# Patient Record
Sex: FEMALE | Race: WHITE | Employment: OTHER | URBAN - METROPOLITAN AREA
[De-identification: names, ages, dates, MRNs, and addresses within clinical notes are randomized per-mention and may not be internally consistent; named-entity substitution may affect disease eponyms.]

---

## 2018-03-15 ENCOUNTER — HOSPITAL ENCOUNTER (OUTPATIENT)
Dept: MAMMOGRAPHY | Age: 83
Discharge: HOME | End: 2018-03-15
Attending: INTERNAL MEDICINE | Admitting: INTERNAL MEDICINE

## 2018-03-15 DIAGNOSIS — Z12.31 SCREENING MAMMOGRAM, ENCOUNTER FOR: ICD-10-CM

## 2019-03-25 ENCOUNTER — HOSPITAL ENCOUNTER (OUTPATIENT)
Dept: MAMMOGRAPHY | Age: 84
Discharge: HOME | End: 2019-03-25
Payer: MEDICARE

## 2019-03-25 DIAGNOSIS — Z12.31 ENCOUNTER FOR SCREENING MAMMOGRAM FOR BREAST CANCER: ICD-10-CM

## 2019-03-25 PROCEDURE — 77063 BREAST TOMOSYNTHESIS BI: CPT

## 2020-01-01 ENCOUNTER — APPOINTMENT (OUTPATIENT)
Dept: NUCLEAR MEDICINE | Age: 85
DRG: 291 | End: 2020-01-01
Payer: MEDICARE

## 2020-01-01 ENCOUNTER — HOSPITAL ENCOUNTER (OUTPATIENT)
Age: 85
Setting detail: SPECIMEN
Discharge: HOME | End: 2020-11-09
Payer: MEDICARE

## 2020-01-01 ENCOUNTER — CARE COORDINATION (OUTPATIENT)
Dept: CASE MANAGEMENT | Age: 85
End: 2020-01-01

## 2020-01-01 ENCOUNTER — HOSPITAL ENCOUNTER (INPATIENT)
Age: 85
LOS: 6 days | Discharge: HOME HEALTH | DRG: 291 | End: 2020-08-27
Attending: EMERGENCY MEDICINE | Admitting: INTERNAL MEDICINE
Payer: MEDICARE

## 2020-01-01 ENCOUNTER — APPOINTMENT (OUTPATIENT)
Dept: CT IMAGING | Age: 85
DRG: 291 | End: 2020-01-01
Payer: MEDICARE

## 2020-01-01 ENCOUNTER — APPOINTMENT (OUTPATIENT)
Dept: GENERAL RADIOLOGY | Age: 85
End: 2020-01-01
Payer: MEDICARE

## 2020-01-01 ENCOUNTER — APPOINTMENT (OUTPATIENT)
Dept: GENERAL RADIOLOGY | Age: 85
DRG: 291 | End: 2020-01-01
Payer: MEDICARE

## 2020-01-01 ENCOUNTER — APPOINTMENT (OUTPATIENT)
Dept: CT IMAGING | Age: 85
End: 2020-01-01
Payer: MEDICARE

## 2020-01-01 ENCOUNTER — HOSPITAL ENCOUNTER (OUTPATIENT)
Age: 85
Setting detail: OBSERVATION
Discharge: NURSING, REHAB OR LONG-TERM CARE | End: 2020-11-24
Attending: EMERGENCY MEDICINE | Admitting: FAMILY MEDICINE
Payer: MEDICARE

## 2020-01-01 ENCOUNTER — HOSPITAL ENCOUNTER (EMERGENCY)
Age: 85
End: 2020-12-03
Attending: EMERGENCY MEDICINE
Payer: MEDICARE

## 2020-01-01 VITALS
SYSTOLIC BLOOD PRESSURE: 127 MMHG | BODY MASS INDEX: 20.87 KG/M2 | RESPIRATION RATE: 16 BRPM | WEIGHT: 137.7 LBS | TEMPERATURE: 97.9 F | HEIGHT: 68 IN | HEART RATE: 98 BPM | OXYGEN SATURATION: 98 % | DIASTOLIC BLOOD PRESSURE: 71 MMHG

## 2020-01-01 VITALS
HEIGHT: 68 IN | BODY MASS INDEX: 21.38 KG/M2 | TEMPERATURE: 97.9 F | RESPIRATION RATE: 16 BRPM | DIASTOLIC BLOOD PRESSURE: 64 MMHG | HEART RATE: 65 BPM | WEIGHT: 141.09 LBS | OXYGEN SATURATION: 98 % | SYSTOLIC BLOOD PRESSURE: 145 MMHG

## 2020-01-01 VITALS — DIASTOLIC BLOOD PRESSURE: 82 MMHG | RESPIRATION RATE: 82 BRPM | SYSTOLIC BLOOD PRESSURE: 156 MMHG | HEART RATE: 111 BPM

## 2020-01-01 LAB
A/G RATIO: 1.5 (ref 1.1–2.2)
A/G RATIO: 1.5 (ref 1.1–2.2)
A/G RATIO: 1.7 (ref 1.1–2.2)
ALBUMIN SERPL-MCNC: 4.1 G/DL (ref 3.4–5)
ALBUMIN SERPL-MCNC: 4.5 G/DL (ref 3.4–5)
ALBUMIN SERPL-MCNC: 4.6 G/DL (ref 3.4–5)
ALP BLD-CCNC: 71 U/L (ref 40–129)
ALP BLD-CCNC: 86 U/L (ref 40–129)
ALP BLD-CCNC: 94 U/L (ref 40–129)
ALT SERPL-CCNC: 10 U/L (ref 10–40)
ALT SERPL-CCNC: 14 U/L (ref 10–40)
ALT SERPL-CCNC: 8 U/L (ref 10–40)
ANION GAP SERPL CALCULATED.3IONS-SCNC: 10 MMOL/L (ref 3–16)
ANION GAP SERPL CALCULATED.3IONS-SCNC: 13 MMOL/L (ref 3–16)
ANION GAP SERPL CALCULATED.3IONS-SCNC: 17 MMOL/L (ref 3–16)
ANION GAP SERPL CALCULATED.3IONS-SCNC: 4 MMOL/L (ref 3–16)
ANION GAP SERPL CALCULATED.3IONS-SCNC: 7 MMOL/L (ref 3–16)
AST SERPL-CCNC: 21 U/L (ref 15–37)
AST SERPL-CCNC: 23 U/L (ref 15–37)
AST SERPL-CCNC: 27 U/L (ref 15–37)
BASOPHILS ABSOLUTE: 0 K/UL (ref 0–0.2)
BASOPHILS RELATIVE PERCENT: 0.5 %
BASOPHILS RELATIVE PERCENT: 0.6 %
BASOPHILS RELATIVE PERCENT: 0.6 %
BILIRUB SERPL-MCNC: 0.9 MG/DL (ref 0–1)
BILIRUB SERPL-MCNC: 1 MG/DL (ref 0–1)
BILIRUB SERPL-MCNC: 1.8 MG/DL (ref 0–1)
BILIRUBIN URINE: NEGATIVE
BILIRUBIN URINE: NEGATIVE
BLOOD CULTURE, ROUTINE: NORMAL
BLOOD, URINE: ABNORMAL
BLOOD, URINE: NEGATIVE
BUN BLDV-MCNC: 13 MG/DL (ref 7–20)
BUN BLDV-MCNC: 17 MG/DL (ref 7–20)
BUN BLDV-MCNC: 18 MG/DL (ref 7–20)
BUN BLDV-MCNC: 18 MG/DL (ref 7–20)
BUN BLDV-MCNC: 20 MG/DL (ref 7–20)
BUN BLDV-MCNC: 27 MG/DL (ref 7–20)
BUN BLDV-MCNC: 32 MG/DL (ref 7–20)
CALCIUM SERPL-MCNC: 10.1 MG/DL (ref 8.3–10.6)
CALCIUM SERPL-MCNC: 10.3 MG/DL (ref 8.3–10.6)
CALCIUM SERPL-MCNC: 10.3 MG/DL (ref 8.3–10.6)
CALCIUM SERPL-MCNC: 10.6 MG/DL (ref 8.3–10.6)
CALCIUM SERPL-MCNC: 10.7 MG/DL (ref 8.3–10.6)
CALCIUM SERPL-MCNC: 10.7 MG/DL (ref 8.3–10.6)
CALCIUM SERPL-MCNC: 10.8 MG/DL (ref 8.3–10.6)
CHLORIDE BLD-SCNC: 101 MMOL/L (ref 99–110)
CHLORIDE BLD-SCNC: 90 MMOL/L (ref 99–110)
CHLORIDE BLD-SCNC: 92 MMOL/L (ref 99–110)
CHLORIDE BLD-SCNC: 96 MMOL/L (ref 99–110)
CHLORIDE BLD-SCNC: 98 MMOL/L (ref 99–110)
CHLORIDE BLD-SCNC: 99 MMOL/L (ref 99–110)
CHLORIDE BLD-SCNC: 99 MMOL/L (ref 99–110)
CLARITY: CLEAR
CLARITY: CLEAR
CO2: 27 MMOL/L (ref 21–32)
CO2: 28 MMOL/L (ref 21–32)
CO2: 29 MMOL/L (ref 21–32)
CO2: 29 MMOL/L (ref 21–32)
CO2: 31 MMOL/L (ref 21–32)
CO2: 31 MMOL/L (ref 21–32)
CO2: 33 MMOL/L (ref 21–32)
COLOR: YELLOW
COLOR: YELLOW
CREAT SERPL-MCNC: 0.7 MG/DL (ref 0.6–1.2)
CREAT SERPL-MCNC: 0.8 MG/DL (ref 0.6–1.2)
CREAT SERPL-MCNC: 0.8 MG/DL (ref 0.6–1.2)
CREAT SERPL-MCNC: 0.9 MG/DL (ref 0.6–1.2)
CREAT SERPL-MCNC: 1 MG/DL (ref 0.6–1.2)
CREAT SERPL-MCNC: 1.1 MG/DL (ref 0.6–1.2)
CREAT SERPL-MCNC: <0.5 MG/DL (ref 0.6–1.2)
CULTURE, BLOOD 2: NORMAL
EKG ATRIAL RATE: 416 BPM
EKG ATRIAL RATE: 60 BPM
EKG DIAGNOSIS: NORMAL
EKG DIAGNOSIS: NORMAL
EKG Q-T INTERVAL: 412 MS
EKG Q-T INTERVAL: 474 MS
EKG QRS DURATION: 150 MS
EKG QRS DURATION: 152 MS
EKG QTC CALCULATION (BAZETT): 475 MS
EKG QTC CALCULATION (BAZETT): 489 MS
EKG R AXIS: -88 DEGREES
EKG R AXIS: 267 DEGREES
EKG T AXIS: 64 DEGREES
EKG T AXIS: 94 DEGREES
EKG VENTRICULAR RATE: 64 BPM
EKG VENTRICULAR RATE: 80 BPM
EOSINOPHILS ABSOLUTE: 0.1 K/UL (ref 0–0.6)
EOSINOPHILS ABSOLUTE: 0.1 K/UL (ref 0–0.6)
EOSINOPHILS ABSOLUTE: 0.2 K/UL (ref 0–0.6)
EOSINOPHILS RELATIVE PERCENT: 1.6 %
EOSINOPHILS RELATIVE PERCENT: 2.3 %
EOSINOPHILS RELATIVE PERCENT: 2.8 %
EPITHELIAL CELLS, UA: ABNORMAL /HPF (ref 0–5)
FOLATE: 10.29 NG/ML (ref 4.78–24.2)
GFR AFRICAN AMERICAN: 56
GFR AFRICAN AMERICAN: >60
GFR NON-AFRICAN AMERICAN: 46
GFR NON-AFRICAN AMERICAN: 52
GFR NON-AFRICAN AMERICAN: 58
GFR NON-AFRICAN AMERICAN: >60
GLOBULIN: 2.7 G/DL
GLOBULIN: 2.7 G/DL
GLOBULIN: 3 G/DL
GLUCOSE BLD-MCNC: 103 MG/DL (ref 70–99)
GLUCOSE BLD-MCNC: 104 MG/DL (ref 70–99)
GLUCOSE BLD-MCNC: 90 MG/DL (ref 70–99)
GLUCOSE BLD-MCNC: 90 MG/DL (ref 70–99)
GLUCOSE BLD-MCNC: 92 MG/DL (ref 70–99)
GLUCOSE BLD-MCNC: 95 MG/DL (ref 70–99)
GLUCOSE BLD-MCNC: 96 MG/DL (ref 70–99)
GLUCOSE BLD-MCNC: 98 MG/DL (ref 70–99)
GLUCOSE URINE: NEGATIVE MG/DL
GLUCOSE URINE: NEGATIVE MG/DL
HCT VFR BLD CALC: 38.5 % (ref 36–48)
HCT VFR BLD CALC: 39.2 % (ref 36–48)
HCT VFR BLD CALC: 39.9 % (ref 36–48)
HCT VFR BLD CALC: 40.4 % (ref 36–48)
HCT VFR BLD CALC: 42.6 % (ref 36–48)
HEMOGLOBIN: 12.9 G/DL (ref 12–16)
HEMOGLOBIN: 13 G/DL (ref 12–16)
HEMOGLOBIN: 13.2 G/DL (ref 12–16)
HEMOGLOBIN: 13.3 G/DL (ref 12–16)
HEMOGLOBIN: 14.4 G/DL (ref 12–16)
HYALINE CASTS: ABNORMAL /LPF (ref 0–2)
INR BLD: 1.49 (ref 0.86–1.14)
INR BLD: 1.62 (ref 0.86–1.14)
INR BLD: 1.7 (ref 0.86–1.14)
INR BLD: 2.45 (ref 0.86–1.14)
INR BLD: 2.58 (ref 0.86–1.14)
INR BLD: 2.81 (ref 0.86–1.14)
INR BLD: 3.03 (ref 0.86–1.14)
INR BLD: 3.6 (ref 0.86–1.14)
INR BLD: 3.74 (ref 0.86–1.14)
INR BLD: 3.89 (ref 0.86–1.14)
KETONES, URINE: ABNORMAL MG/DL
KETONES, URINE: NEGATIVE MG/DL
LACTIC ACID, SEPSIS: 0.8 MMOL/L (ref 0.4–1.9)
LEUKOCYTE ESTERASE, URINE: NEGATIVE
LEUKOCYTE ESTERASE, URINE: NEGATIVE
LV EF: 58 %
LV EF: 66 %
LVEF MODALITY: NORMAL
LVEF MODALITY: NORMAL
LYMPHOCYTES ABSOLUTE: 1.2 K/UL (ref 1–5.1)
LYMPHOCYTES ABSOLUTE: 1.4 K/UL (ref 1–5.1)
LYMPHOCYTES ABSOLUTE: 1.4 K/UL (ref 1–5.1)
LYMPHOCYTES RELATIVE PERCENT: 20.6 %
LYMPHOCYTES RELATIVE PERCENT: 22.4 %
LYMPHOCYTES RELATIVE PERCENT: 25.1 %
MAGNESIUM: 1.9 MG/DL (ref 1.8–2.4)
MAGNESIUM: 2.4 MG/DL (ref 1.8–2.4)
MCH RBC QN AUTO: 31.6 PG (ref 26–34)
MCH RBC QN AUTO: 31.8 PG (ref 26–34)
MCHC RBC AUTO-ENTMCNC: 32.7 G/DL (ref 31–36)
MCHC RBC AUTO-ENTMCNC: 33.2 G/DL (ref 31–36)
MCHC RBC AUTO-ENTMCNC: 33.3 G/DL (ref 31–36)
MCHC RBC AUTO-ENTMCNC: 33.4 G/DL (ref 31–36)
MCHC RBC AUTO-ENTMCNC: 33.7 G/DL (ref 31–36)
MCV RBC AUTO: 94.4 FL (ref 80–100)
MCV RBC AUTO: 94.4 FL (ref 80–100)
MCV RBC AUTO: 95.6 FL (ref 80–100)
MCV RBC AUTO: 95.7 FL (ref 80–100)
MCV RBC AUTO: 97.3 FL (ref 80–100)
MICROSCOPIC EXAMINATION: ABNORMAL
MICROSCOPIC EXAMINATION: YES
MONOCYTES ABSOLUTE: 0.6 K/UL (ref 0–1.3)
MONOCYTES ABSOLUTE: 0.6 K/UL (ref 0–1.3)
MONOCYTES ABSOLUTE: 0.7 K/UL (ref 0–1.3)
MONOCYTES RELATIVE PERCENT: 10.3 %
MONOCYTES RELATIVE PERCENT: 10.4 %
MONOCYTES RELATIVE PERCENT: 11.7 %
MUCUS: ABNORMAL /LPF
NEUTROPHILS ABSOLUTE: 3.5 K/UL (ref 1.7–7.7)
NEUTROPHILS ABSOLUTE: 3.8 K/UL (ref 1.7–7.7)
NEUTROPHILS ABSOLUTE: 4 K/UL (ref 1.7–7.7)
NEUTROPHILS RELATIVE PERCENT: 61.7 %
NEUTROPHILS RELATIVE PERCENT: 63.9 %
NEUTROPHILS RELATIVE PERCENT: 65.5 %
NITRITE, URINE: NEGATIVE
NITRITE, URINE: NEGATIVE
PDW BLD-RTO: 13 % (ref 12.4–15.4)
PDW BLD-RTO: 13.3 % (ref 12.4–15.4)
PDW BLD-RTO: 13.3 % (ref 12.4–15.4)
PDW BLD-RTO: 13.8 % (ref 12.4–15.4)
PDW BLD-RTO: 13.9 % (ref 12.4–15.4)
PERFORMED ON: NORMAL
PH UA: 6 (ref 5–8)
PH UA: 6 (ref 5–8)
PLATELET # BLD: 162 K/UL (ref 135–450)
PLATELET # BLD: 167 K/UL (ref 135–450)
PLATELET # BLD: 177 K/UL (ref 135–450)
PLATELET # BLD: 185 K/UL (ref 135–450)
PLATELET # BLD: 203 K/UL (ref 135–450)
PMV BLD AUTO: 10.3 FL (ref 5–10.5)
PMV BLD AUTO: 9 FL (ref 5–10.5)
PMV BLD AUTO: 9.2 FL (ref 5–10.5)
PMV BLD AUTO: 9.2 FL (ref 5–10.5)
PMV BLD AUTO: 9.6 FL (ref 5–10.5)
POTASSIUM REFLEX MAGNESIUM: 3.8 MMOL/L (ref 3.5–5.1)
POTASSIUM REFLEX MAGNESIUM: 3.8 MMOL/L (ref 3.5–5.1)
POTASSIUM REFLEX MAGNESIUM: 4.6 MMOL/L (ref 3.5–5.1)
POTASSIUM SERPL-SCNC: 3.8 MMOL/L (ref 3.5–5.1)
POTASSIUM SERPL-SCNC: 4.4 MMOL/L (ref 3.5–5.1)
POTASSIUM SERPL-SCNC: 4.7 MMOL/L (ref 3.5–5.1)
POTASSIUM SERPL-SCNC: 4.7 MMOL/L (ref 3.5–5.1)
PRO-BNP: 2505 PG/ML (ref 0–449)
PROTEIN UA: NEGATIVE MG/DL
PROTEIN UA: NEGATIVE MG/DL
PROTHROMBIN TIME: 17.4 SEC (ref 10–13.2)
PROTHROMBIN TIME: 18.9 SEC (ref 10–13.2)
PROTHROMBIN TIME: 19.8 SEC (ref 10–13.2)
PROTHROMBIN TIME: 28.7 SEC (ref 10–13.2)
PROTHROMBIN TIME: 29.1 SEC (ref 10–13.2)
PROTHROMBIN TIME: 32.9 SEC (ref 10–13.2)
PROTHROMBIN TIME: 35.5 SEC (ref 10–13.2)
PROTHROMBIN TIME: 42.3 SEC (ref 10–13.2)
PROTHROMBIN TIME: 44 SEC (ref 10–13.2)
PROTHROMBIN TIME: 45.8 SEC (ref 10–13.2)
RBC # BLD: 4.08 M/UL (ref 4–5.2)
RBC # BLD: 4.09 M/UL (ref 4–5.2)
RBC # BLD: 4.15 M/UL (ref 4–5.2)
RBC # BLD: 4.18 M/UL (ref 4–5.2)
RBC # BLD: 4.51 M/UL (ref 4–5.2)
RBC UA: ABNORMAL /HPF (ref 0–4)
SARS-COV-2, NAAT: NOT DETECTED
SARS-COV-2, PCR: NOT DETECTED
SODIUM BLD-SCNC: 136 MMOL/L (ref 136–145)
SODIUM BLD-SCNC: 137 MMOL/L (ref 136–145)
SODIUM BLD-SCNC: 138 MMOL/L (ref 136–145)
SODIUM BLD-SCNC: 138 MMOL/L (ref 136–145)
SODIUM BLD-SCNC: 139 MMOL/L (ref 136–145)
SPECIFIC GRAVITY UA: 1.02 (ref 1–1.03)
SPECIFIC GRAVITY UA: 1.02 (ref 1–1.03)
TOTAL PROTEIN: 6.8 G/DL (ref 6.4–8.2)
TOTAL PROTEIN: 7.3 G/DL (ref 6.4–8.2)
TOTAL PROTEIN: 7.5 G/DL (ref 6.4–8.2)
TROPONIN: <0.01 NG/ML
TSH REFLEX: 1.5 UIU/ML (ref 0.27–4.2)
URINE REFLEX TO CULTURE: ABNORMAL
URINE REFLEX TO CULTURE: ABNORMAL
URINE TYPE: ABNORMAL
URINE TYPE: ABNORMAL
UROBILINOGEN, URINE: 2 E.U./DL
UROBILINOGEN, URINE: 4 E.U./DL
VITAMIN B-12: 687 PG/ML (ref 211–911)
WBC # BLD: 5.3 K/UL (ref 4–11)
WBC # BLD: 5.7 K/UL (ref 4–11)
WBC # BLD: 5.9 K/UL (ref 4–11)
WBC # BLD: 6.2 K/UL (ref 4–11)
WBC # BLD: 7.4 K/UL (ref 4–11)
WBC UA: ABNORMAL /HPF (ref 0–5)

## 2020-01-01 PROCEDURE — 99285 EMERGENCY DEPT VISIT HI MDM: CPT

## 2020-01-01 PROCEDURE — 6370000000 HC RX 637 (ALT 250 FOR IP): Performed by: INTERNAL MEDICINE

## 2020-01-01 PROCEDURE — 6370000000 HC RX 637 (ALT 250 FOR IP): Performed by: FAMILY MEDICINE

## 2020-01-01 PROCEDURE — 51798 US URINE CAPACITY MEASURE: CPT

## 2020-01-01 PROCEDURE — 94640 AIRWAY INHALATION TREATMENT: CPT

## 2020-01-01 PROCEDURE — 80048 BASIC METABOLIC PNL TOTAL CA: CPT

## 2020-01-01 PROCEDURE — 36415 COLL VENOUS BLD VENIPUNCTURE: CPT

## 2020-01-01 PROCEDURE — 85025 COMPLETE CBC W/AUTO DIFF WBC: CPT

## 2020-01-01 PROCEDURE — G0378 HOSPITAL OBSERVATION PER HR: HCPCS

## 2020-01-01 PROCEDURE — 2580000003 HC RX 258: Performed by: INTERNAL MEDICINE

## 2020-01-01 PROCEDURE — 78452 HT MUSCLE IMAGE SPECT MULT: CPT

## 2020-01-01 PROCEDURE — 2700000000 HC OXYGEN THERAPY PER DAY

## 2020-01-01 PROCEDURE — 85027 COMPLETE CBC AUTOMATED: CPT

## 2020-01-01 PROCEDURE — 97535 SELF CARE MNGMENT TRAINING: CPT

## 2020-01-01 PROCEDURE — U0003 INFECTIOUS AGENT DETECTION BY NUCLEIC ACID (DNA OR RNA); SEVERE ACUTE RESPIRATORY SYNDROME CORONAVIRUS 2 (SARS-COV-2) (CORONAVIRUS DISEASE [COVID-19]), AMPLIFIED PROBE TECHNIQUE, MAKING USE OF HIGH THROUGHPUT TECHNOLOGIES AS DESCRIBED BY CMS-2020-01-R: HCPCS

## 2020-01-01 PROCEDURE — 70450 CT HEAD/BRAIN W/O DYE: CPT

## 2020-01-01 PROCEDURE — 83605 ASSAY OF LACTIC ACID: CPT

## 2020-01-01 PROCEDURE — 93005 ELECTROCARDIOGRAM TRACING: CPT | Performed by: NURSE PRACTITIONER

## 2020-01-01 PROCEDURE — 1200000000 HC SEMI PRIVATE

## 2020-01-01 PROCEDURE — 85610 PROTHROMBIN TIME: CPT

## 2020-01-01 PROCEDURE — 6360000002 HC RX W HCPCS: Performed by: REGISTERED NURSE

## 2020-01-01 PROCEDURE — 84443 ASSAY THYROID STIM HORMONE: CPT

## 2020-01-01 PROCEDURE — 6370000000 HC RX 637 (ALT 250 FOR IP): Performed by: NURSE PRACTITIONER

## 2020-01-01 PROCEDURE — 6370000000 HC RX 637 (ALT 250 FOR IP): Performed by: REGISTERED NURSE

## 2020-01-01 PROCEDURE — 71045 X-RAY EXAM CHEST 1 VIEW: CPT

## 2020-01-01 PROCEDURE — 3430000000 HC RX DIAGNOSTIC RADIOPHARMACEUTICAL: Performed by: INTERNAL MEDICINE

## 2020-01-01 PROCEDURE — 94761 N-INVAS EAR/PLS OXIMETRY MLT: CPT

## 2020-01-01 PROCEDURE — A9502 TC99M TETROFOSMIN: HCPCS | Performed by: INTERNAL MEDICINE

## 2020-01-01 PROCEDURE — 6360000002 HC RX W HCPCS: Performed by: INTERNAL MEDICINE

## 2020-01-01 PROCEDURE — 97166 OT EVAL MOD COMPLEX 45 MIN: CPT

## 2020-01-01 PROCEDURE — 71250 CT THORAX DX C-: CPT

## 2020-01-01 PROCEDURE — 82746 ASSAY OF FOLIC ACID SERUM: CPT

## 2020-01-01 PROCEDURE — 97530 THERAPEUTIC ACTIVITIES: CPT

## 2020-01-01 PROCEDURE — 87040 BLOOD CULTURE FOR BACTERIA: CPT

## 2020-01-01 PROCEDURE — 99283 EMERGENCY DEPT VISIT LOW MDM: CPT

## 2020-01-01 PROCEDURE — 2580000003 HC RX 258: Performed by: NURSE PRACTITIONER

## 2020-01-01 PROCEDURE — 84484 ASSAY OF TROPONIN QUANT: CPT

## 2020-01-01 PROCEDURE — 51701 INSERT BLADDER CATHETER: CPT

## 2020-01-01 PROCEDURE — 2580000003 HC RX 258: Performed by: REGISTERED NURSE

## 2020-01-01 PROCEDURE — 80053 COMPREHEN METABOLIC PANEL: CPT

## 2020-01-01 PROCEDURE — 99291 CRITICAL CARE FIRST HOUR: CPT

## 2020-01-01 PROCEDURE — 83735 ASSAY OF MAGNESIUM: CPT

## 2020-01-01 PROCEDURE — 93010 ELECTROCARDIOGRAM REPORT: CPT | Performed by: INTERNAL MEDICINE

## 2020-01-01 PROCEDURE — 93005 ELECTROCARDIOGRAM TRACING: CPT | Performed by: EMERGENCY MEDICINE

## 2020-01-01 PROCEDURE — 2500000003 HC RX 250 WO HCPCS: Performed by: REGISTERED NURSE

## 2020-01-01 PROCEDURE — 97165 OT EVAL LOW COMPLEX 30 MIN: CPT

## 2020-01-01 PROCEDURE — 81003 URINALYSIS AUTO W/O SCOPE: CPT

## 2020-01-01 PROCEDURE — 81001 URINALYSIS AUTO W/SCOPE: CPT

## 2020-01-01 PROCEDURE — 99284 EMERGENCY DEPT VISIT MOD MDM: CPT

## 2020-01-01 PROCEDURE — U0002 COVID-19 LAB TEST NON-CDC: HCPCS

## 2020-01-01 PROCEDURE — 93017 CV STRESS TEST TRACING ONLY: CPT

## 2020-01-01 PROCEDURE — 99223 1ST HOSP IP/OBS HIGH 75: CPT | Performed by: INTERNAL MEDICINE

## 2020-01-01 PROCEDURE — P9612 CATHETERIZE FOR URINE SPEC: HCPCS

## 2020-01-01 PROCEDURE — 97116 GAIT TRAINING THERAPY: CPT

## 2020-01-01 PROCEDURE — 96361 HYDRATE IV INFUSION ADD-ON: CPT

## 2020-01-01 PROCEDURE — 96360 HYDRATION IV INFUSION INIT: CPT

## 2020-01-01 PROCEDURE — 71046 X-RAY EXAM CHEST 2 VIEWS: CPT

## 2020-01-01 PROCEDURE — 93306 TTE W/DOPPLER COMPLETE: CPT

## 2020-01-01 PROCEDURE — 83880 ASSAY OF NATRIURETIC PEPTIDE: CPT

## 2020-01-01 PROCEDURE — 97161 PT EVAL LOW COMPLEX 20 MIN: CPT

## 2020-01-01 PROCEDURE — 82607 VITAMIN B-12: CPT

## 2020-01-01 PROCEDURE — 2580000003 HC RX 258: Performed by: FAMILY MEDICINE

## 2020-01-01 PROCEDURE — 97162 PT EVAL MOD COMPLEX 30 MIN: CPT

## 2020-01-01 PROCEDURE — 92950 HEART/LUNG RESUSCITATION CPR: CPT

## 2020-01-01 RX ORDER — FUROSEMIDE 10 MG/ML
40 INJECTION INTRAMUSCULAR; INTRAVENOUS 2 TIMES DAILY
Status: COMPLETED | OUTPATIENT
Start: 2020-01-01 | End: 2020-01-01

## 2020-01-01 RX ORDER — SODIUM CHLORIDE 0.9 % (FLUSH) 0.9 %
10 SYRINGE (ML) INJECTION PRN
Status: DISCONTINUED | OUTPATIENT
Start: 2020-01-01 | End: 2020-01-01 | Stop reason: HOSPADM

## 2020-01-01 RX ORDER — WARFARIN SODIUM 5 MG/1
5 TABLET ORAL
Status: DISPENSED | OUTPATIENT
Start: 2020-01-01 | End: 2020-01-01

## 2020-01-01 RX ORDER — PROMETHAZINE HYDROCHLORIDE 25 MG/1
12.5 TABLET ORAL EVERY 6 HOURS PRN
Status: DISCONTINUED | OUTPATIENT
Start: 2020-01-01 | End: 2020-01-01 | Stop reason: HOSPADM

## 2020-01-01 RX ORDER — MAGNESIUM SULFATE 1 G/100ML
1 INJECTION INTRAVENOUS PRN
Status: DISCONTINUED | OUTPATIENT
Start: 2020-01-01 | End: 2020-01-01

## 2020-01-01 RX ORDER — QUETIAPINE FUMARATE 25 MG/1
50 TABLET, FILM COATED ORAL ONCE
Status: COMPLETED | OUTPATIENT
Start: 2020-01-01 | End: 2020-01-01

## 2020-01-01 RX ORDER — LABETALOL HYDROCHLORIDE 5 MG/ML
5 INJECTION, SOLUTION INTRAVENOUS EVERY 4 HOURS PRN
Status: DISCONTINUED | OUTPATIENT
Start: 2020-01-01 | End: 2020-01-01

## 2020-01-01 RX ORDER — ACETAMINOPHEN 650 MG/1
650 SUPPOSITORY RECTAL EVERY 6 HOURS PRN
Status: DISCONTINUED | OUTPATIENT
Start: 2020-01-01 | End: 2020-01-01 | Stop reason: HOSPADM

## 2020-01-01 RX ORDER — METOPROLOL SUCCINATE 25 MG/1
25 TABLET, EXTENDED RELEASE ORAL DAILY
Qty: 30 TABLET | Refills: 3 | Status: SHIPPED | OUTPATIENT
Start: 2020-01-01

## 2020-01-01 RX ORDER — DICYCLOMINE HCL 20 MG
20 TABLET ORAL EVERY 6 HOURS
Status: DISCONTINUED | OUTPATIENT
Start: 2020-01-01 | End: 2020-01-01 | Stop reason: HOSPADM

## 2020-01-01 RX ORDER — 0.9 % SODIUM CHLORIDE 0.9 %
500 INTRAVENOUS SOLUTION INTRAVENOUS ONCE
Status: COMPLETED | OUTPATIENT
Start: 2020-01-01 | End: 2020-01-01

## 2020-01-01 RX ORDER — POLYETHYLENE GLYCOL 3350 17 G/17G
17 POWDER, FOR SOLUTION ORAL DAILY PRN
Status: DISCONTINUED | OUTPATIENT
Start: 2020-01-01 | End: 2020-01-01 | Stop reason: HOSPADM

## 2020-01-01 RX ORDER — WARFARIN SODIUM 5 MG/1
5 TABLET ORAL
Status: COMPLETED | OUTPATIENT
Start: 2020-01-01 | End: 2020-01-01

## 2020-01-01 RX ORDER — FAMOTIDINE 20 MG/1
20 TABLET, FILM COATED ORAL DAILY
Status: DISCONTINUED | OUTPATIENT
Start: 2020-01-01 | End: 2020-01-01

## 2020-01-01 RX ORDER — 0.9 % SODIUM CHLORIDE 0.9 %
1000 INTRAVENOUS SOLUTION INTRAVENOUS ONCE
Status: COMPLETED | OUTPATIENT
Start: 2020-01-01 | End: 2020-01-01

## 2020-01-01 RX ORDER — ACETAMINOPHEN 325 MG/1
650 TABLET ORAL EVERY 6 HOURS PRN
Status: DISCONTINUED | OUTPATIENT
Start: 2020-01-01 | End: 2020-01-01 | Stop reason: HOSPADM

## 2020-01-01 RX ORDER — METOPROLOL SUCCINATE 25 MG/1
25 TABLET, EXTENDED RELEASE ORAL DAILY
Status: DISCONTINUED | OUTPATIENT
Start: 2020-01-01 | End: 2020-01-01 | Stop reason: HOSPADM

## 2020-01-01 RX ORDER — SPIRONOLACTONE 25 MG/1
25 TABLET ORAL DAILY
Status: DISCONTINUED | OUTPATIENT
Start: 2020-01-01 | End: 2020-01-01

## 2020-01-01 RX ORDER — ALBUTEROL SULFATE 2.5 MG/3ML
2.5 SOLUTION RESPIRATORY (INHALATION) EVERY 6 HOURS PRN
Status: DISCONTINUED | OUTPATIENT
Start: 2020-01-01 | End: 2020-01-01 | Stop reason: HOSPADM

## 2020-01-01 RX ORDER — FUROSEMIDE 40 MG/1
40 TABLET ORAL DAILY
Status: DISCONTINUED | OUTPATIENT
Start: 2020-01-01 | End: 2020-01-01

## 2020-01-01 RX ORDER — METHOCARBAMOL 750 MG/1
1500 TABLET, FILM COATED ORAL 3 TIMES DAILY PRN
Status: DISCONTINUED | OUTPATIENT
Start: 2020-01-01 | End: 2020-01-01 | Stop reason: HOSPADM

## 2020-01-01 RX ORDER — ALBUTEROL SULFATE 90 UG/1
2 AEROSOL, METERED RESPIRATORY (INHALATION) EVERY 6 HOURS PRN
Status: DISCONTINUED | OUTPATIENT
Start: 2020-01-01 | End: 2020-01-01

## 2020-01-01 RX ORDER — SODIUM CHLORIDE 0.9 % (FLUSH) 0.9 %
10 SYRINGE (ML) INJECTION EVERY 12 HOURS SCHEDULED
Status: DISCONTINUED | OUTPATIENT
Start: 2020-01-01 | End: 2020-01-01 | Stop reason: HOSPADM

## 2020-01-01 RX ORDER — POTASSIUM CHLORIDE 20 MEQ/1
40 TABLET, EXTENDED RELEASE ORAL PRN
Status: DISCONTINUED | OUTPATIENT
Start: 2020-01-01 | End: 2020-01-01

## 2020-01-01 RX ORDER — POTASSIUM CHLORIDE 7.45 MG/ML
10 INJECTION INTRAVENOUS PRN
Status: DISCONTINUED | OUTPATIENT
Start: 2020-01-01 | End: 2020-01-01

## 2020-01-01 RX ORDER — WARFARIN SODIUM 2.5 MG/1
2.5 TABLET ORAL DAILY
Qty: 30 TABLET | Refills: 3 | Status: SHIPPED | OUTPATIENT
Start: 2020-01-01

## 2020-01-01 RX ORDER — METOPROLOL TARTRATE 5 MG/5ML
2.5 INJECTION INTRAVENOUS EVERY 6 HOURS PRN
Status: DISCONTINUED | OUTPATIENT
Start: 2020-01-01 | End: 2020-01-01 | Stop reason: HOSPADM

## 2020-01-01 RX ORDER — ALBUTEROL SULFATE 2.5 MG/3ML
2.5 SOLUTION RESPIRATORY (INHALATION) EVERY 4 HOURS PRN
Status: DISCONTINUED | OUTPATIENT
Start: 2020-01-01 | End: 2020-01-01 | Stop reason: HOSPADM

## 2020-01-01 RX ORDER — HALOPERIDOL 5 MG/ML
2 INJECTION INTRAMUSCULAR EVERY 6 HOURS PRN
Status: DISCONTINUED | OUTPATIENT
Start: 2020-01-01 | End: 2020-01-01 | Stop reason: HOSPADM

## 2020-01-01 RX ORDER — FUROSEMIDE 10 MG/ML
40 INJECTION INTRAMUSCULAR; INTRAVENOUS ONCE
Status: COMPLETED | OUTPATIENT
Start: 2020-01-01 | End: 2020-01-01

## 2020-01-01 RX ORDER — WARFARIN SODIUM 2.5 MG/1
2.5 TABLET ORAL DAILY
Status: DISCONTINUED | OUTPATIENT
Start: 2020-01-01 | End: 2020-01-01 | Stop reason: ALTCHOICE

## 2020-01-01 RX ORDER — WARFARIN SODIUM 2 MG/1
4 TABLET ORAL
Status: COMPLETED | OUTPATIENT
Start: 2020-01-01 | End: 2020-01-01

## 2020-01-01 RX ORDER — QUETIAPINE FUMARATE 50 MG/1
50 TABLET, EXTENDED RELEASE ORAL NIGHTLY
Status: DISCONTINUED | OUTPATIENT
Start: 2020-01-01 | End: 2020-01-01 | Stop reason: HOSPADM

## 2020-01-01 RX ORDER — PROCHLORPERAZINE EDISYLATE 5 MG/ML
10 INJECTION INTRAMUSCULAR; INTRAVENOUS EVERY 6 HOURS PRN
Status: DISCONTINUED | OUTPATIENT
Start: 2020-01-01 | End: 2020-01-01 | Stop reason: HOSPADM

## 2020-01-01 RX ORDER — WARFARIN SODIUM 5 MG/1
5 TABLET ORAL
Status: DISCONTINUED | OUTPATIENT
Start: 2020-01-01 | End: 2020-01-01 | Stop reason: HOSPADM

## 2020-01-01 RX ORDER — ALBUTEROL SULFATE 90 UG/1
2 AEROSOL, METERED RESPIRATORY (INHALATION)
Status: DISCONTINUED | OUTPATIENT
Start: 2020-01-01 | End: 2020-01-01

## 2020-01-01 RX ORDER — ALBUTEROL SULFATE 2.5 MG/3ML
2.5 SOLUTION RESPIRATORY (INHALATION) EVERY 6 HOURS PRN
Status: DISCONTINUED | OUTPATIENT
Start: 2020-01-01 | End: 2020-01-01

## 2020-01-01 RX ORDER — SODIUM CHLORIDE 9 MG/ML
INJECTION, SOLUTION INTRAVENOUS CONTINUOUS
Status: DISCONTINUED | OUTPATIENT
Start: 2020-01-01 | End: 2020-01-01

## 2020-01-01 RX ORDER — FUROSEMIDE 40 MG/1
40 TABLET ORAL DAILY
Status: DISCONTINUED | OUTPATIENT
Start: 2020-01-01 | End: 2020-01-01 | Stop reason: HOSPADM

## 2020-01-01 RX ORDER — LISINOPRIL 20 MG/1
20 TABLET ORAL DAILY
Status: DISCONTINUED | OUTPATIENT
Start: 2020-01-01 | End: 2020-01-01 | Stop reason: HOSPADM

## 2020-01-01 RX ORDER — ONDANSETRON 2 MG/ML
4 INJECTION INTRAMUSCULAR; INTRAVENOUS EVERY 6 HOURS PRN
Status: DISCONTINUED | OUTPATIENT
Start: 2020-01-01 | End: 2020-01-01 | Stop reason: HOSPADM

## 2020-01-01 RX ORDER — ALBUTEROL SULFATE 90 UG/1
2 AEROSOL, METERED RESPIRATORY (INHALATION) EVERY 4 HOURS PRN
Status: DISCONTINUED | OUTPATIENT
Start: 2020-01-01 | End: 2020-01-01 | Stop reason: HOSPADM

## 2020-01-01 RX ORDER — FAMOTIDINE 20 MG/1
20 TABLET, FILM COATED ORAL DAILY
Status: DISCONTINUED | OUTPATIENT
Start: 2020-01-01 | End: 2020-01-01 | Stop reason: HOSPADM

## 2020-01-01 RX ORDER — POTASSIUM CHLORIDE 20 MEQ/1
20 TABLET, EXTENDED RELEASE ORAL DAILY
Status: DISCONTINUED | OUTPATIENT
Start: 2020-01-01 | End: 2020-01-01 | Stop reason: HOSPADM

## 2020-01-01 RX ADMIN — FUROSEMIDE 40 MG: 40 TABLET ORAL at 09:10

## 2020-01-01 RX ADMIN — Medication 2 PUFF: at 08:01

## 2020-01-01 RX ADMIN — DICYCLOMINE HYDROCHLORIDE 20 MG: 20 TABLET ORAL at 10:30

## 2020-01-01 RX ADMIN — DICYCLOMINE HYDROCHLORIDE 20 MG: 20 TABLET ORAL at 22:31

## 2020-01-01 RX ADMIN — FUROSEMIDE 40 MG: 40 TABLET ORAL at 08:47

## 2020-01-01 RX ADMIN — FAMOTIDINE 20 MG: 20 TABLET, FILM COATED ORAL at 09:09

## 2020-01-01 RX ADMIN — FAMOTIDINE 20 MG: 10 INJECTION, SOLUTION INTRAVENOUS at 14:24

## 2020-01-01 RX ADMIN — TIOTROPIUM BROMIDE INHALATION SPRAY 2 PUFF: 3.12 SPRAY, METERED RESPIRATORY (INHALATION) at 08:00

## 2020-01-01 RX ADMIN — POTASSIUM CHLORIDE 20 MEQ: 1500 TABLET, EXTENDED RELEASE ORAL at 08:47

## 2020-01-01 RX ADMIN — HALOPERIDOL LACTATE 2 MG: 5 INJECTION, SOLUTION INTRAMUSCULAR at 11:34

## 2020-01-01 RX ADMIN — TETROFOSMIN 32.1 MILLICURIE: 1.38 INJECTION, POWDER, LYOPHILIZED, FOR SOLUTION INTRAVENOUS at 12:25

## 2020-01-01 RX ADMIN — DICYCLOMINE HYDROCHLORIDE 20 MG: 20 TABLET ORAL at 16:21

## 2020-01-01 RX ADMIN — Medication 10 ML: at 21:57

## 2020-01-01 RX ADMIN — DICYCLOMINE HYDROCHLORIDE 20 MG: 20 TABLET ORAL at 22:05

## 2020-01-01 RX ADMIN — QUETIAPINE FUMARATE 50 MG: 50 TABLET, EXTENDED RELEASE ORAL at 21:46

## 2020-01-01 RX ADMIN — Medication 10 ML: at 09:02

## 2020-01-01 RX ADMIN — Medication 2 PUFF: at 15:17

## 2020-01-01 RX ADMIN — FUROSEMIDE 40 MG: 40 TABLET ORAL at 09:01

## 2020-01-01 RX ADMIN — Medication 10 ML: at 08:11

## 2020-01-01 RX ADMIN — FUROSEMIDE 40 MG: 10 INJECTION, SOLUTION INTRAMUSCULAR; INTRAVENOUS at 08:11

## 2020-01-01 RX ADMIN — TIOTROPIUM BROMIDE INHALATION SPRAY 2 PUFF: 3.12 SPRAY, METERED RESPIRATORY (INHALATION) at 08:10

## 2020-01-01 RX ADMIN — METOPROLOL SUCCINATE 25 MG: 25 TABLET, EXTENDED RELEASE ORAL at 10:32

## 2020-01-01 RX ADMIN — METOPROLOL SUCCINATE 25 MG: 25 TABLET, EXTENDED RELEASE ORAL at 08:11

## 2020-01-01 RX ADMIN — WARFARIN SODIUM 5 MG: 5 TABLET ORAL at 17:41

## 2020-01-01 RX ADMIN — DICYCLOMINE HYDROCHLORIDE 20 MG: 20 TABLET ORAL at 22:28

## 2020-01-01 RX ADMIN — METOPROLOL SUCCINATE 25 MG: 25 TABLET, EXTENDED RELEASE ORAL at 09:01

## 2020-01-01 RX ADMIN — FUROSEMIDE 40 MG: 10 INJECTION, SOLUTION INTRAMUSCULAR; INTRAVENOUS at 17:22

## 2020-01-01 RX ADMIN — Medication 2 PUFF: at 07:59

## 2020-01-01 RX ADMIN — DICYCLOMINE HYDROCHLORIDE 20 MG: 20 TABLET ORAL at 09:48

## 2020-01-01 RX ADMIN — SODIUM CHLORIDE: 9 INJECTION, SOLUTION INTRAVENOUS at 03:20

## 2020-01-01 RX ADMIN — METOPROLOL SUCCINATE 25 MG: 25 TABLET, EXTENDED RELEASE ORAL at 09:02

## 2020-01-01 RX ADMIN — Medication 10 ML: at 22:29

## 2020-01-01 RX ADMIN — DICYCLOMINE HYDROCHLORIDE 20 MG: 20 TABLET ORAL at 06:46

## 2020-01-01 RX ADMIN — Medication 10 ML: at 09:10

## 2020-01-01 RX ADMIN — FUROSEMIDE 40 MG: 40 TABLET ORAL at 09:48

## 2020-01-01 RX ADMIN — FAMOTIDINE 20 MG: 20 TABLET, FILM COATED ORAL at 08:11

## 2020-01-01 RX ADMIN — Medication 10 ML: at 22:31

## 2020-01-01 RX ADMIN — QUETIAPINE FUMARATE 50 MG: 50 TABLET, EXTENDED RELEASE ORAL at 21:43

## 2020-01-01 RX ADMIN — FAMOTIDINE 20 MG: 20 TABLET ORAL at 09:48

## 2020-01-01 RX ADMIN — ACETAMINOPHEN 650 MG: 325 TABLET ORAL at 12:56

## 2020-01-01 RX ADMIN — FUROSEMIDE 40 MG: 10 INJECTION, SOLUTION INTRAMUSCULAR; INTRAVENOUS at 18:54

## 2020-01-01 RX ADMIN — DICYCLOMINE HYDROCHLORIDE 20 MG: 20 TABLET ORAL at 05:30

## 2020-01-01 RX ADMIN — DICYCLOMINE HYDROCHLORIDE 20 MG: 20 TABLET ORAL at 06:30

## 2020-01-01 RX ADMIN — Medication 2 PUFF: at 19:58

## 2020-01-01 RX ADMIN — TIOTROPIUM BROMIDE INHALATION SPRAY 2 PUFF: 3.12 SPRAY, METERED RESPIRATORY (INHALATION) at 08:28

## 2020-01-01 RX ADMIN — TIOTROPIUM BROMIDE INHALATION SPRAY 2 PUFF: 3.12 SPRAY, METERED RESPIRATORY (INHALATION) at 07:59

## 2020-01-01 RX ADMIN — ACETAMINOPHEN 650 MG: 325 TABLET ORAL at 12:26

## 2020-01-01 RX ADMIN — TIOTROPIUM BROMIDE INHALATION SPRAY 2 PUFF: 3.12 SPRAY, METERED RESPIRATORY (INHALATION) at 08:01

## 2020-01-01 RX ADMIN — METOPROLOL SUCCINATE 25 MG: 25 TABLET, EXTENDED RELEASE ORAL at 08:47

## 2020-01-01 RX ADMIN — Medication 10 ML: at 21:46

## 2020-01-01 RX ADMIN — FAMOTIDINE 20 MG: 20 TABLET ORAL at 08:47

## 2020-01-01 RX ADMIN — SPIRONOLACTONE 25 MG: 25 TABLET ORAL at 09:02

## 2020-01-01 RX ADMIN — Medication 2 PUFF: at 15:44

## 2020-01-01 RX ADMIN — LISINOPRIL 20 MG: 20 TABLET ORAL at 09:49

## 2020-01-01 RX ADMIN — SPIRONOLACTONE 25 MG: 25 TABLET ORAL at 09:01

## 2020-01-01 RX ADMIN — QUETIAPINE FUMARATE 50 MG: 25 TABLET ORAL at 00:12

## 2020-01-01 RX ADMIN — DICYCLOMINE HYDROCHLORIDE 20 MG: 20 TABLET ORAL at 15:12

## 2020-01-01 RX ADMIN — LISINOPRIL 20 MG: 20 TABLET ORAL at 08:47

## 2020-01-01 RX ADMIN — Medication 10 ML: at 08:47

## 2020-01-01 RX ADMIN — POTASSIUM CHLORIDE 20 MEQ: 1500 TABLET, EXTENDED RELEASE ORAL at 09:49

## 2020-01-01 RX ADMIN — SPIRONOLACTONE 25 MG: 25 TABLET ORAL at 09:10

## 2020-01-01 RX ADMIN — Medication 2 PUFF: at 11:44

## 2020-01-01 RX ADMIN — Medication 10 ML: at 09:23

## 2020-01-01 RX ADMIN — REGADENOSON 0.4 MG: 0.08 INJECTION, SOLUTION INTRAVENOUS at 12:25

## 2020-01-01 RX ADMIN — DICYCLOMINE HYDROCHLORIDE 20 MG: 20 TABLET ORAL at 16:28

## 2020-01-01 RX ADMIN — Medication 10 ML: at 22:05

## 2020-01-01 RX ADMIN — Medication 2 PUFF: at 08:29

## 2020-01-01 RX ADMIN — DICYCLOMINE HYDROCHLORIDE 20 MG: 20 TABLET ORAL at 17:21

## 2020-01-01 RX ADMIN — DICYCLOMINE HYDROCHLORIDE 20 MG: 20 TABLET ORAL at 11:03

## 2020-01-01 RX ADMIN — SODIUM CHLORIDE 1000 ML: 9 INJECTION, SOLUTION INTRAVENOUS at 19:40

## 2020-01-01 RX ADMIN — FAMOTIDINE 20 MG: 20 TABLET, FILM COATED ORAL at 09:01

## 2020-01-01 RX ADMIN — TETROFOSMIN 10 MILLICURIE: 1.38 INJECTION, POWDER, LYOPHILIZED, FOR SOLUTION INTRAVENOUS at 10:20

## 2020-01-01 RX ADMIN — DICYCLOMINE HYDROCHLORIDE 20 MG: 20 TABLET ORAL at 09:10

## 2020-01-01 RX ADMIN — METOPROLOL SUCCINATE 25 MG: 25 TABLET, EXTENDED RELEASE ORAL at 09:48

## 2020-01-01 RX ADMIN — Medication 2 PUFF: at 08:10

## 2020-01-01 RX ADMIN — FAMOTIDINE 20 MG: 10 INJECTION, SOLUTION INTRAVENOUS at 08:08

## 2020-01-01 RX ADMIN — SODIUM CHLORIDE 500 ML: 9 INJECTION, SOLUTION INTRAVENOUS at 21:43

## 2020-01-01 RX ADMIN — FAMOTIDINE 20 MG: 20 TABLET, FILM COATED ORAL at 09:02

## 2020-01-01 RX ADMIN — DICYCLOMINE HYDROCHLORIDE 20 MG: 20 TABLET ORAL at 21:46

## 2020-01-01 RX ADMIN — DICYCLOMINE HYDROCHLORIDE 20 MG: 20 TABLET ORAL at 08:11

## 2020-01-01 RX ADMIN — DICYCLOMINE HYDROCHLORIDE 20 MG: 20 TABLET ORAL at 12:26

## 2020-01-01 RX ADMIN — WARFARIN SODIUM 5 MG: 5 TABLET ORAL at 18:25

## 2020-01-01 RX ADMIN — WARFARIN SODIUM 5 MG: 5 TABLET ORAL at 22:28

## 2020-01-01 RX ADMIN — SODIUM CHLORIDE: 9 INJECTION, SOLUTION INTRAVENOUS at 14:25

## 2020-01-01 RX ADMIN — FUROSEMIDE 40 MG: 40 TABLET ORAL at 09:02

## 2020-01-01 RX ADMIN — Medication 10 ML: at 08:08

## 2020-01-01 RX ADMIN — DICYCLOMINE HYDROCHLORIDE 20 MG: 20 TABLET ORAL at 21:56

## 2020-01-01 RX ADMIN — Medication 10 ML: at 09:03

## 2020-01-01 RX ADMIN — WARFARIN SODIUM 4 MG: 2 TABLET ORAL at 18:29

## 2020-01-01 RX ADMIN — QUETIAPINE FUMARATE 50 MG: 50 TABLET, EXTENDED RELEASE ORAL at 21:56

## 2020-01-01 RX ADMIN — Medication 2 PUFF: at 19:34

## 2020-01-01 RX ADMIN — Medication 2 PUFF: at 23:01

## 2020-01-01 RX ADMIN — DICYCLOMINE HYDROCHLORIDE 20 MG: 20 TABLET ORAL at 21:57

## 2020-01-01 RX ADMIN — SPIRONOLACTONE 25 MG: 25 TABLET ORAL at 08:10

## 2020-01-01 ASSESSMENT — PAIN DESCRIPTION - ORIENTATION
ORIENTATION: RIGHT
ORIENTATION: RIGHT;LEFT

## 2020-01-01 ASSESSMENT — PAIN SCALES - GENERAL
PAINLEVEL_OUTOF10: 4
PAINLEVEL_OUTOF10: 0
PAINLEVEL_OUTOF10: 8
PAINLEVEL_OUTOF10: 0
PAINLEVEL_OUTOF10: 5
PAINLEVEL_OUTOF10: 0

## 2020-01-01 ASSESSMENT — PAIN DESCRIPTION - DESCRIPTORS: DESCRIPTORS: ACHING

## 2020-01-01 ASSESSMENT — PAIN - FUNCTIONAL ASSESSMENT: PAIN_FUNCTIONAL_ASSESSMENT: 0-10

## 2020-01-01 ASSESSMENT — PAIN DESCRIPTION - LOCATION
LOCATION: SHOULDER;BACK
LOCATION: ARM

## 2020-01-01 ASSESSMENT — PAIN DESCRIPTION - PAIN TYPE
TYPE: ACUTE PAIN

## 2020-08-21 PROBLEM — I50.43 CHF (CONGESTIVE HEART FAILURE), NYHA CLASS I, ACUTE ON CHRONIC, COMBINED (HCC): Status: ACTIVE | Noted: 2020-01-01

## 2020-08-21 PROBLEM — R07.9 CHEST PAIN: Status: ACTIVE | Noted: 2020-01-01

## 2020-08-21 NOTE — H&P
Hospital Medicine History & Physical      PCP: Randell Castro MD    Date of Admission: 8/21/2020    Date of Service: Pt seen/examined on 08/21/20 and Admitted to Inpatient with expected LOS greater than two midnights due to medical therapy. Chief Complaint:  Dyspnea, chest pressure    History Of Present Illness: The patient is a pleasant 80 Y F with a h/o HTN, HLD, CAD, CHF, and Afib. She is in relatively good health, still mentally sharp, lives at home with her , walks around without assistance. For the last two months she has experienced progressive dyspnea, particularly with exertion. Initially she only noticed this when she was walking around the grocery store, but it has progressed to the point that even just walking from one side of the room to the other gets her very winded and she has to breath heavily for a couple minutes. When asked, she does say that lying flat makes her more dyspneic, and that lately she has been waking up in the middle of the night gasping for air. Her legs have been more edematous than usual for about a month. She has been on the same dose of furosemide for years, and she doesn't think it is helping anymore. She doesn't weigh herself regularly. She gets a severe \"smothering\" feeling when she is short of breath (patient clutches her chest). It does feel like an uncomfortable pressure. The patient was very dyspneic with walking in the ED and desaturated into the mid 80's. She hasn't been having any cough, fevers, or chills, and there were no infiltrates on lung imaging, but she was swabbed for COVID just to be sure. Hospital medicine was called for admission.         Past Medical History:          Diagnosis Date    Allergic rhinitis     Arthritis     states she has Osteo arthritis    Atrial fibrillation (HCC)     Dr. Misael Ochoa CAD (coronary artery disease)     states AFib, 2 valves seeping, pacemaker    [facility]     states Vaping Type             Family History:      Reviewed in detail and negative for ESRD. Positive as follows:        Problem Relation Age of Onset    Heart Disease Mother     Cancer Sister         lung  and breast       REVIEW OF SYSTEMS:   Pertinent positives as noted in the HPI. All other systems reviewed and negative. PHYSICAL EXAM PERFORMED:    BP (!) 187/83   Pulse 63   Temp 97.4 °F (36.3 °C) (Oral)   Resp (!) 35   Ht 5' 8\" (1.727 m)   Wt 153 lb (69.4 kg)   SpO2 97%   BMI 23.26 kg/m²     General appearance:  No apparent distress, appears stated age and cooperative. HEENT:  Normal cephalic, atraumatic without obvious deformity. Pupils equal, round, and reactive to light. Extra ocular muscles intact. Conjunctivae/corneas clear. Hard of hearing. Neck: Supple, with full range of motion. No jugular venous distention. Trachea midline. Respiratory:  Normal respiratory effort. Bilaterally without Wheezes/Rhonchi. Faint bibasilar rales. Not coughing. Cardiovascular:  Regular rate and rhythm with normal S1/S2 without murmurs, rubs or gallops. Abdomen: Soft, non-tender, non-distended with normal bowel sounds. Musculoskeletal:  No clubbing, cyanosis. 2+ BLE pitting edema. Full range of motion without deformity. Skin: Skin color, texture, turgor normal.  No rashes or lesions. Neurologic:  Neurovascularly intact without any focal sensory/motor deficits.  Cranial nerves: II-XII intact, grossly non-focal.  Psychiatric:  Alert and oriented, thought content appropriate, normal insight  Capillary Refill: Brisk,< 3 seconds   Peripheral Pulses: +2 palpable, equal bilaterally       Labs:     Recent Labs     08/21/20  1232   WBC 6.2   HGB 13.3   HCT 39.9        Recent Labs     08/21/20  1232      K 4.7   CL 99   CO2 27   BUN 18   CREATININE 0.9   CALCIUM 10.7*     Recent Labs     08/21/20  1232   AST 21   ALT 8*   BILITOT 1.0   ALKPHOS 86     No results for input(s): INR in the last 72 hours. Recent Labs     08/21/20  1232   TROPONINI <0.01       Urinalysis:      Lab Results   Component Value Date    NITRU Negative 08/15/2017    WBCUA 0-2 08/15/2017    BACTERIA Rare 01/09/2014    RBCUA 3-5 08/15/2017    BLOODU TRACE-INTACT 08/15/2017    SPECGRAV 1.015 08/15/2017    GLUCOSEU Negative 08/15/2017       Radiology:     CXR: I have reviewed the CXR with the following interpretation: questionable ILD  EKG:  I have reviewed the EKG with the following interpretation: afib, v-paced, nonspecific abnormalities    CT CHEST WO CONTRAST   Preliminary Result   1. Small left pleural effusion. 2. No focal lung consolidation. 3. New indeterminate 6 mm right lower lobe pulmonary nodule. Follow-up chest   CT can be considered in 6-12 months. Follow-up should take into account the   patient's age and patient's comorbidities. 4. Minimal linear areas of atelectasis and scarring at the lung bases. XR CHEST PORTABLE   Final Result   Cardiomegaly with chronic interstitial changes. No definite infiltrate or   edema             ASSESSMENT:    Active Hospital Problems    Diagnosis Date Noted    Chest pain [R07.9] 08/21/2020    CHF (congestive heart failure), NYHA class I, acute on chronic, [facility] [I50.43] 08/21/2020         PLAN:    The patient is a pleasant 80 Y F with a h/o HTN, HLD, CAD, CHF, and Afib. She is in relatively good health, still mentally sharp, lives at home with her , walks around without assistance. For the last two months she has experienced progressive dyspnea, particularly with exertion. Initially she only noticed this when she was walking around the grocery store, but it has progressed to the point that even just walking from one side of the room to the other gets her very winded and she has to breath heavily for a couple minutes.   When asked, she does say that lying flat makes her more dyspneic, and that lately she has been waking up in the middle of the night gasping for air. Her legs have been more edematous than usual for about a month. She has been on the same dose of furosemide for years, and she doesn't think it is helping anymore. She doesn't weigh herself regularly. She gets a severe \"smothering\" feeling when she is short of breath (patient clutches her chest). It does feel like an uncomfortable pressure. The patient was very dyspneic with walking in the ED and desaturated into the mid 80's. She hasn't been having any cough, fevers, or chills, and there were no infiltrates on lung imaging, but she was swabbed for COVID just to be sure. Hospital medicine was called for admission. Suspected acute on chronic diastolic CHF  - admittedly there was no pulmonary edema on either CXR or CT, but the patient had convincing symptoms, bibasilar rales, and BLE pitting edema. F/u BNP. - she normally takes furosemide 40 po qd. Started with 40 IV BID here. Continue her chronic spironolactone. - I confirmed with her outpatient pharmacy that she no longer takes benazepril. She had a cough with lisinopril and angioedema with olmesartan. - continue her chronic metoprolol.  - f/u TTE    Chest pain  - perhaps just due to above. Trop and EKG reassuring. She does have significant risk factors, f/u nuclear stress test.  - I do not suspect PE or acute aortic pathology     Dyspnea, acute hypoxic respiratory failure  - due to the above issues, treat accordingly. - of note, the patient is frail, kyphotic, has h/o pulm HTN, and is s/p right middle lobectomy for lung cancer years ago (continue outpatient monitoring for the 6 mm RLL nodule - it is the same size at 10 months ago). She also carries a diagnosis of severe COPD (pulmonologist Dr. Nga Leiva) but supposedly she never smoked. One would expect all of these issues to cause a cumulative pulmonary insufficiency at her age, particularly with exertion. - wean to RA as tolerated.   The patient has no supplemental O2

## 2020-08-21 NOTE — ED PROVIDER NOTES
CHIEF COMPLAINT  Chest Pain (Chest pain x3-4 weeks. States \"When I wake up in the morngings I start feeling bad shortly after\") and Shortness of Breath (Shortness of breath x3-4 weeks)      HISTORY OF PRESENT ILLNESS  Luz Hubbard is a 80 y.o. female with a history of atrial fibrillation, CAD, CHF, and COPD who presents to the ED complaining of shortness of breath and chest pain. Patient reports over the last 3 to 4 weeks she has noted some intermittent chest pains that occur in the morning. Patient was also noted consistent episodes of mild shortness of breath with intermittent exacerbation that she reports as severe. Patient currently reports her discomfort is 5/10. She denies fevers, chills, or sweats. Nonproductive cough is noted. Mild lower extremity swelling is also noted. .   No other complaints, modifying factors or associated symptoms. I have reviewed the following from the nursing documentation.     Past Medical History:   Diagnosis Date    Allergic rhinitis     Arthritis     states she has Osteo arthritis    Atrial fibrillation (HCC)     Dr. Daylin Valverde CAD (coronary artery disease)     states AFib, 2 valves seeping, pacemaker    CHF (congestive [facility]) ([facility]     states she was dx with it once    COPD (chronic obstructive pulmonary disease) ([facility]     severe 6/09, moderately Severe 12/11 PFT    Diverticulosis     GERD (gastroesophageal reflux disease)     states she goes to [employer])     Hiatal hernia     gastritis    HTN     states 2 years ago and on medication for it    Hyperlipidemia     states she has a history not on medication now    Lung cancer [facility]) 1997    right middle lobectomy    Macular degeneration     right eye    Osteoporosis     Dr Max Nazario Pneumonia     Pulmonary Hypertension      Past Surgical History:   Procedure Laterality Date    APPENDECTOMY      CATARACT REMOVAL      right    CHOLECYSTECTOMY      COLONOSCOPY  10/07    COLONOSCOPY 4/16/2014    polyps, diverticulosis    LUNG REMOVAL, PARTIAL  1997    right middle    PACEMAKER INSERTION      SKIN BIOPSY      states negative one off of the face    UPPER GASTROINTESTINAL ENDOSCOPY  10/2011    UPPER GASTROINTESTINAL ENDOSCOPY  7/14/2014    Dilated esophagus     Family History   Problem Relation Age of Onset    Heart Disease Mother     Cancer Sister         lung  and breast     Social History     Socioeconomic History    Marital status:      Spouse name: Not on file    Number of children: Not on file    Years of education: Not on file    Highest education level: Not on file   Occupational History    Not on file   Social Needs    Financial resource strain: Not on file    Food insecurity     Worry: Not on file     Inability: Not on file    Transportation needs     Medical: Not on file     Non-medical: Not on file   Tobacco Use    Smoking status: Never Smoker    Smokeless tobacco: Never Used   Substance and Sexual Activity    Alcohol use: No    Drug use: Never    Sexual activity: Yes     Partners: Male   Lifestyle    Physical activity     Days per week: Not on file     Minutes per session: Not on file    Stress: Not on file   Relationships    Social connections     Talks on phone: Not on file     Gets together: Not on file     Attends Sikh service: Not on file     Active member of club or organization: Not on file     Attends meetings of clubs or organizations: Not on file     Relationship status: Not on file    Intimate partner violence     Fear of current or ex partner: Not on file     Emotionally abused: Not on file     Physically abused: Not on file     Forced sexual activity: Not on file   Other Topics Concern    Not on file   Social History Narrative    Not on file     No current facility-administered medications for this encounter.       Current Outpatient Medications   Medication Sig Dispense Refill    hydrocortisone (ANUSOL-HC) 2.5 % rectal cream Place rectally 2 times daily. 1 Tube 0    gatifloxacin (ZYMAR) 0.5 % SOLN Place 1 drop into the right eye 4 times daily as needed. 1 Bottle 3    dicyclomine (BENTYL) 20 MG tablet Take 1 tablet by mouth every 6 hours. 120 tablet 3    tiotropium (SPIRIVA HANDIHALER) 18 MCG inhalation capsule Inhale 1 capsule into the lungs daily. 90 capsule 3    ranitidine (ZANTAC) 150 MG capsule Take 1 capsule by mouth 2 times daily. For heartburn/reflux 180 capsule 3    linaclotide (LINZESS) 145 MCG capsule Take 1 capsule by mouth every morning (before breakfast). 30 capsule 6    Probiotic Product (PROBIOTIC ACIDOPHILUS) CAPS Take  by mouth 2 times daily.  warfarin (COUMADIN) 5 MG tablet Take 1 tablet by mouth Daily. 90 tablet 3    benazepril (LOTENSIN) 20 MG tablet Take 0.5 tablets by mouth 2 times daily for 90 days. 90 tablet 3    acetaminophen (TYLENOL) 500 MG tablet Take 500 mg by mouth every 6 hours as needed.  furosemide (LASIX) 40 MG tablet Take 1 tablet by mouth daily. 60 tablet 3    potassium chloride SA (K-DUR;KLOR-CON M) 20 MEQ tablet Take 1 tablet by mouth daily. 30 tablet 3    albuterol (PROVENTIL) (2.5 MG/3ML) 0.083% nebulizer solution Take 3 mLs by nebulization every 4 hours as needed. 120 each 12    furosemide (LASIX) 40 MG tablet Take 1 tablet by mouth daily. 30 tablet 6    esomeprazole (NEXIUM) 40 MG capsule Take 1 capsule by mouth daily for 15 days. 15 capsule 0    Multiple Vitamin (MULTIVITAMINS PO) Take 1 tablet by mouth daily.  Calcium-Vitamin D (CALTRATE 600 PLUS-VIT D PO) Take 1 tablet by mouth daily.        Allergies   Allergen Reactions    Latex Hives    Advair [Fluticasone-Salmeterol]     Alendronate Sodium Other (See Comments)     GERD      Avapro [Irbesartan]     Avelox [Moxifloxacin Hcl In Nacl] Hives    Carafate [Sucralfate]      Dizziness, shortness of breath    Ciprofloxacin Hives and Swelling    Clindamycin/Lincomycin     Crestor [Rosuvastatin]     Erythromycin     Flagyl [Metronidazole]     Gemfibrozil Nausea Only    Iodine     Lipitor [Atorvastatin]     Penicillins     Pravachol [Pravastatin Sodium] Hives    Prednisone     Sulfa Antibiotics      rash    Benicar [Olmesartan Medoxomil] Rash     Face swelling    Lisinopril Other (See Comments)     Cough       REVIEW OF SYSTEMS  10 systems reviewed, pertinent positives per HPI otherwise noted to be negative. PHYSICAL EXAM  BP (!) 201/103   Pulse 85   Temp 97.4 °F (36.3 °C) (Oral)   Resp 18   Ht 5' 8\" (1.727 m)   Wt 153 lb (69.4 kg)   SpO2 95%   BMI 23.26 kg/m²   GENERAL APPEARANCE: Awake and alert. Cooperative. No acute distress. HEAD: Normocephalic. Atraumatic. EYES: PERRL. EOM's grossly intact. ENT: Mucous membranes are moist.   NECK: Supple, trachea midline. HEART: RRR. Normal S1S2, no rubs, gallops, or murmurs noted  LUNGS: Respirations unlabored. CTAB. Good air exchange. Wheezing/rhonchi noted. No rales. Speaking comfortably in full sentences. ABDOMEN: Soft. Non-distended. Non-tender. No guarding or rebound. Normal bowel sounds. EXTREMITIES: No peripheral edema. MAEE. No acute deformities. SKIN: Warm and dry. No acute rashes. NEUROLOGICAL: Alert and oriented X 3. CN II-XII intact. No gross facial drooping. Strength 5/5, sensation intact. Normal coordination. No pronator drift. Gait normal.   PSYCHIATRIC: Normal mood and affect. LABS  I have reviewed all labs for this visit.    Results for orders placed or performed during the hospital encounter of 08/21/20   CBC Auto Differential   Result Value Ref Range    WBC 6.2 4.0 - 11.0 K/uL    RBC 4.18 4.00 - 5.20 M/uL    Hemoglobin 13.3 12.0 - 16.0 g/dL    Hematocrit 39.9 36.0 - 48.0 %    MCV 95.6 80.0 - 100.0 fL    MCH 31.8 26.0 - 34.0 pg    MCHC 33.2 31.0 - 36.0 g/dL    RDW 13.3 12.4 - 15.4 %    Platelets 614 176 - 332 K/uL    MPV 9.2 5.0 - 10.5 fL    Neutrophils % 63.9 %    Lymphocytes % 22.4 %    Monocytes % 10.3 %    Eosinophils % 2.8 % COURSE/MDM  Patient seen and evaluated. Old records reviewed. Labs and imaging reviewed and results discussed with patient. Patient was given supplemental oxygen, steroids, and nebulizer treatments in the ED with good symptomatic relief. Patient was reassessed as noted above . Patient will be admitted to hospitalist after further evaluation and treatment. . Plan of care discussed with patient and family. Patient and family in agreement with plan. Patient was given scripts for the following medications. I counseled patient how to take these medications. Current Discharge Medication List          CLINICAL IMPRESSION  1. Shortness of breath    2. Hypoxia    3. Lung nodule        Blood pressure (!) 163/84, pulse 88, temperature 97.6 °F (36.4 °C), temperature source Oral, resp. rate 28, height 5' 8\" (1.727 m), weight 153 lb (69.4 kg), SpO2 99 %. DISPOSITION  Melba Das was admitted in stable condition.          Tee Brunner, DO  08/21/20 7912 Ryan Miller, DO  08/21/20 3529

## 2020-08-21 NOTE — ED NOTES
Bed: 21  Expected date: 8/21/20  Expected time: 12:04 PM  Means of arrival: Hue  Comments:  [facility] [address] [facility]  08/21/20 9028

## 2020-08-21 NOTE — ED NOTES
Ambulated patient from room down the hallway and back to the room, patients oxygen saturation dropped down to 87 she also stated that she was short of breath. Before leaving patients room had her take several deep breaths her oxygen then bumped up 92 percent.        Marquis Ramsey  08/21/20 1523

## 2020-08-21 NOTE — ED NOTES
PT given turkey sandwich, soup and water. PT awaiting a bed assignment.       Ellen Driver RN  08/21/20 5618

## 2020-08-22 NOTE — PROGRESS NOTES
Pt agitated and confused. Swatted at [facility]. RN asked pt if she wanted to give her  a call to let him know that she will be getting a new room. Pt too agitated at this time to make phone call.

## 2020-08-22 NOTE — PROGRESS NOTES
Hospitalist Progress Note      PCP: Michael Garcia MD    Date of Admission: 8/21/2020    Chief Complaint: SOB, chest pain    Hospital Course: The patient is a pleasant 80 Y F with a h/o HTN, HLD, CAD, CHF, and Afib. She is in relatively good health, still mentally sharp, lives at home with her , walks around without assistance. For the last two months she has experienced progressive dyspnea, particularly with exertion. Initially she only noticed this when she was walking around the grocery store, but it has progressed to the point that even just walking from one side of the room to the other gets her very winded and she has to breath heavily for a couple minutes. When asked, she does say that lying flat makes her more dyspneic, and that lately she has been waking up in the middle of the night gasping for air. Her legs have been more edematous than usual for about a month. She has been on the same dose of furosemide for years, and she doesn't think it is helping anymore. She doesn't weigh herself regularly. She gets a severe \"smothering\" feeling when she is short of breath (patient clutches her chest). It does feel like an uncomfortable pressure. The patient was very dyspneic with walking in the ED and desaturated into the mid 80's. She hasn't been having any cough, fevers, or chills, and there were no infiltrates on lung imaging, but she was swabbed for COVID just to be sure. Hospital medicine was called for admission. Subjective: no chest pain today. SOB improving. LE edema improved.       Medications:  Reviewed    Infusion Medications   Scheduled Medications    albuterol sulfate HFA  2 puff Inhalation Q4H WA    dicyclomine  20 mg Oral Q6H    furosemide  40 mg Intravenous BID    linaclotide  145 mcg Oral QAM AC    famotidine  20 mg Oral Daily    tiotropium  2 puff Inhalation Daily    metoprolol succinate  25 mg Oral Daily    spironolactone  25 mg Oral Daily    sodium chloride flush  10 mL Intravenous 2 times per day    warfarin (COUMADIN) daily dosing (placeholder)   Other RX Placeholder     PRN Meds: albuterol, perflutren lipid microspheres, labetalol, magnesium sulfate, potassium chloride **OR** potassium alternative oral replacement **OR** potassium chloride, regadenoson, sodium chloride flush, acetaminophen **OR** acetaminophen, polyethylene glycol, prochlorperazine      Intake/Output Summary (Last 24 hours) at 8/22/2020 1024  Last data filed at 8/22/2020 8924  Gross per 24 hour   Intake 0 ml   Output 1200 ml   Net -1200 ml       Physical Exam Performed:    BP (!) 145/60   Pulse 68   Temp 97.7 °F (36.5 °C) (Oral)   Resp 22   Ht 5' 8\" (1.727 m)   Wt 153 lb (69.4 kg)   SpO2 96%   BMI 23.26 kg/m²     General appearance:  No apparent distress, appears stated age and cooperative. HEENT:  Normal cephalic, atraumatic without obvious deformity. Pupils equal, round, and reactive to light. Extra ocular muscles intact. Conjunctivae/corneas clear. Hard of hearing. Neck: Supple, with full range of motion. No jugular venous distention. Trachea midline. Respiratory:  Normal respiratory effort. Bilaterally without Wheezes/Rhonchi. Faint bibasilar rales. Not coughing. Cardiovascular:  Regular rate and rhythm with normal S1/S2 without murmurs, rubs or gallops. Abdomen: Soft, non-tender, non-distended with normal bowel sounds. Musculoskeletal:  No clubbing, cyanosis. 2+ BLE pitting edema. Full range of motion without deformity. Skin: Skin color, texture, turgor normal.  No rashes or lesions. Neurologic:  Neurovascularly intact without any focal sensory/motor deficits.  Cranial nerves: II-XII intact, grossly non-focal.  Psychiatric:  Alert and oriented, thought content appropriate, normal insight  Capillary Refill: Brisk,< 3 seconds   Peripheral Pulses: +2 palpable, equal bilaterally     Labs:   Recent Labs     08/21/20  1232 08/22/20  0452   WBC 6.2 5.3   HGB 13.3 12.9   HCT 39.9 38.5    177     Recent Labs     08/21/20  1232 08/22/20  0452    138   K 4.7 3.8   CL 99 96*   CO2 27 29   BUN 18 18   CREATININE 0.9 0.8   CALCIUM 10.7* 10.3     Recent Labs     08/21/20  1232   AST 21   ALT 8*   BILITOT 1.0   ALKPHOS 86     Recent Labs     08/21/20  1232   INR 2.81*     Recent Labs     08/21/20  1232 08/21/20  2239 08/22/20  0452   TROPONINI <0.01 <0.01 <0.01       Urinalysis:      Lab Results   Component Value Date    NITRU Negative 08/15/2017    WBCUA 0-2 08/15/2017    BACTERIA Rare 01/09/2014    RBCUA 3-5 08/15/2017    BLOODU TRACE-INTACT 08/15/2017    SPECGRAV 1.015 08/15/2017    GLUCOSEU Negative 08/15/2017       Radiology:  CT CHEST WO CONTRAST   Preliminary Result   1. Small left pleural effusion. 2. No focal lung consolidation. 3. New indeterminate 6 mm right lower lobe pulmonary nodule. Follow-up chest   CT can be considered in 6-12 months. Follow-up should take into account the   patient's age and patient's comorbidities. 4. Minimal linear areas of atelectasis and scarring at the lung bases. XR CHEST PORTABLE   Final Result   Cardiomegaly with chronic interstitial changes. No definite infiltrate or   edema         NM Cardiac Stress Test Nuclear Imaging    (Results Pending)           Assessment/Plan:    Active Hospital Problems    Diagnosis    Chest pain [R07.9]    CHF (congestive heart failure), NYHA class I, acute on chronic, combined (HCC) [I50.43]     Acute on chronic diastolic heart failure  - continue IV lasix. Plan to restart PO tomorrow  - continue home aldactone, metoprolol.  Unable to ACEi/ARB  - echo ordered    Chest pain, no known CAD  - EKG, trop negative  - stress test once COVID ruled out  - holding ASA, statin pending stress test results    Acute hypoxic respiratory failure  - 2/2 CHF, COPD, frailty, h/o lobectomy  - wean O2 as able  - will need walk test prior to discharge    Paroxysmal Afib  - rate controlled with metoprolol  -

## 2020-08-22 NOTE — PROGRESS NOTES
Paged Pal Wilson MD: Vonda Quintero COVID negative and Pt complaints of muscle spasms in her legs. Can she have something for this? Thanks. \"

## 2020-08-22 NOTE — CONSULTS
Pharmacy to Dose Warfarin    Dx: AFib  Goal INR range 2-3  Home Warfarin dose: 5mg daily    Date  INR  Warfarin  8/21                2.81                  5mg    Recommend Warfarin 5mg tonight x1. Daily INR ordered. Rx will continue to manage therapy per consult order.   Ale Hernandez, PharmD 8/21/2020 10:00 PM

## 2020-08-22 NOTE — PROGRESS NOTES
Pharmacy to Dose Warfarin     Dx: AFib  Goal INR range 2-3  Home Warfarin dose: 5mg daily     Date                 INR                  Warfarin  8/21                  2.81                   5 mg  8/22                  3.60                   Hold           Recommend holding warfarin tonight. Daily INR ordered. Rx will continue to manage therapy per consult order.     Neeta Tomlinson [facility]

## 2020-08-22 NOTE — PROGRESS NOTES
Report given to Rakesh Cerna. Tele box placed on pt. [facility] aware of transfer. All belongings transferred with pt including glasses, dentures, purse and clothes. Inhalers sent with pt. Lock box emptied.

## 2020-08-22 NOTE — PROGRESS NOTES
RESPIRATORY THERAPY ASSESSMENT    Name:  Lasha [facility] Record Number:  [id number]  Age: 80 y.o. Gender: female  : 1927  Today's Date:  2020  Room:  0232/0232-01    Assessment     Is the patient being admitted for a COPD or Asthma exacerbation? No   (If yes the patient will be seen every 4 hours for the first 24 hours and then reassessed)    Patient Admission Diagnosis      Allergies  Allergies   Allergen Reactions    Latex Hives    Advair [Fluticasone-Salmeterol]     Alendronate Sodium Other (See Comments)     GERD      Avapro [Irbesartan]     Avelox [Moxifloxacin Hcl In Nacl] Hives    Carafate [Sucralfate]      Dizziness, shortness of breath    Ciprofloxacin Hives and Swelling    Clindamycin/Lincomycin     Crestor [Rosuvastatin]     Erythromycin     Flagyl [Metronidazole]     Gemfibrozil Nausea Only    Iodine     Lipitor [Atorvastatin]     Penicillins     Pravachol [Pravastatin Sodium] Hives    Prednisone     Sulfa Antibiotics      rash    Benicar [Olmesartan Medoxomil] Rash     Face swelling    Lisinopril Other (See Comments)     Cough       Minimum Predicted Vital Capacity:     960          Actual Vital Capacity:      DOUGLAS              Pulmonary History:COPD and CHF/Pulmonary Edema  Home Oxygen Therapy:  room air  Home Respiratory Therapy:Albuterol and Tiotropium Bromide   Current Respiratory Therapy:  HHN and MDI Albuterol PRN, Spiriva Daily  Treatment Type: MDI  Medications: Albuterol    Respiratory Severity Index(RSI)   Patients with orders for inhalation medications, oxygen, or any therapeutic treatment modality will be placed on Respiratory Protocol. They will be assessed with the first treatment and at least every 72 hours thereafter. The following severity scale will be used to determine frequency of treatment intervention.     Smoking History: Pulmonary Disease or Smoking History, Greater than 15 pack year = 2    Social History  Social History     Tobacco Use  Smoking status: Never Smoker    Smokeless tobacco: Never Used   Substance Use Topics    Alcohol use: No    Drug use: Never       Recent Surgical History: None = 0  Past Surgical History  Past Surgical History:   Procedure Laterality Date    APPENDECTOMY      CATARACT REMOVAL      right    CHOLECYSTECTOMY      COLONOSCOPY  10/07    COLONOSCOPY  4/16/2014    polyps, diverticulosis    LUNG REMOVAL, PARTIAL  1997    right middle    PACEMAKER INSERTION      SKIN BIOPSY      states negative one off of the face    UPPER GASTROINTESTINAL ENDOSCOPY  10/2011    UPPER GASTROINTESTINAL ENDOSCOPY  7/14/2014    Dilated esophagus       Level of Consciousness: Alert, Oriented, and Cooperative = 0    Level of Activity: Walking with assistance = 1    Respiratory Pattern: Dyspnea with exertion;Irregular pattern;or RR less than 6 = 2    Breath Sounds: Diminshed bilaterally and/or crackles = 2    Sputum   ,  ,    Cough: Strong, spontaneous, non-productive = 0    Vital Signs   BP (!) 179/70   Pulse 76   Temp 97.4 °F (36.3 °C) (Oral)   Resp 16   Ht 5' 8\" (1.727 m)   Wt 153 lb (69.4 kg)   SpO2 96%   BMI 23.26 kg/m²   SPO2 (COPD values may differ): 90-91% on room air or greater than 92% on FiO2 24- 28% = 1    Peak Flow (asthma only): not applicable = 0    RSI: 7-8 = BID and Q4HPRN (every four hours as needed) for dyspnea        Plan       Goals: medication delivery, mobilize retained secretions, volume expansion and improve oxygenation    Patient/caregiver was educated on the proper method of use for Respiratory Care Devices:  Yes      Level of patient/caregiver understanding able to:   ? Verbalize understanding   ? Demonstrate understanding       ? Teach back        ? Needs reinforcement       ? No available caregiver               ? Other:     Response to education:  Good     Is patient being placed on Home Treatment Regimen? Yes     Does the patient have everything they need prior to discharge?   NA Comments: Patient admits with chest pain and shortness of breath. Plan of Care: MDI Albuterol Q4WA, HHN Albuterol PRN, Spiriva Daily    Electronically signed by Maira Galaviz RCP on 8/22/2020 at 1:23 AM    Respiratory Protocol Guidelines     1. Assessment and treatment by Respiratory Therapy will be initiated for medication and therapeutic interventions upon initiation of aerosolized medication. 2. Physician will be contacted for respiratory rate (RR) greater than 35 breaths per minute. Therapy will be held for heart rate (HR) greater than 140 beats per minute, pending direction from physician. 3. Bronchodilators will be administered via Metered Dose Inhaler (MDI) with spacer when the following criteria are met:  a. Alert and cooperative     b. HR < 140 bpm  c. RR < 30 bpm                d. Can demonstrate a 2-3 second inspiratory hold  4. Bronchodilators will be administered via Hand Held Nebulizer [facility]) to patients when ANY of the following criteria are met  a. Incognizant or uncooperative          b. Patients treated with HHN at Home        c. Unable to demonstrate proper use of MDI with spacer     d. RR > 30 bpm   5. Bronchodilators will be delivered via Metered Dose Inhaler (MDI), HHN, Aerogen to intubated patients on mechanical ventilation. 6. Inhalation medication orders will be delivered and/or substituted as outlined below. Aerosolized Medications Ordering and Administration Guidelines:    1. All Medications will be ordered by a physician, and their frequency and/or modality will be adjusted as defined by the patients Respiratory Severity Index (RSI) score. 2. If the patient does not have documented COPD, consider discontinuing anticholinergics when RSI is less than 9.  3. If the bronchospasm worsens (increased RSI), then the bronchodilator frequency can be increased to a maximum of every 4 hours. If greater than every 4 hours is required, the physician will be contacted.   4. If the bronchospasm improves, the frequency of the bronchodilator can be decreased, based on the patient's RSI, but not less than home treatment regimen frequency. 5. Bronchodilator(s) will be discontinued if patient has a RSI less than 9 and has received no scheduled or as needed treatment for 72  Hrs. Patients Ordered on a Mucolytic Agent:    1. Must always be administered with a bronchodilator. 2. Discontinue if patient experiences worsened bronchospasm, or secretions have lessened to the point that the patient is able to clear them with a cough. Anti-inflammatory and Combination Medications:    1. If the patient lacks prior history of lung disease, is not using inhaled anti-inflammatory medication at home, and lacks wheezing by examination or by history for at least 24 hours, contact physician for possible discontinuation.

## 2020-08-23 NOTE — PROGRESS NOTES
Hospitalist Progress Note      PCP: Edwin Lawson MD    Date of Admission: 8/21/2020    Chief Complaint: SOB, chest pain    Hospital Course: The patient is a pleasant 80 Y F with a h/o HTN, HLD, CAD, CHF, and Afib. She is in relatively good health, still mentally sharp, lives at home with her , walks around without assistance. For the last two months she has experienced progressive dyspnea, particularly with exertion. Initially she only noticed this when she was walking around the grocery store, but it has progressed to the point that even just walking from one side of the room to the other gets her very winded and she has to breath heavily for a couple minutes. When asked, she does say that lying flat makes her more dyspneic, and that lately she has been waking up in the middle of the night gasping for air. Her legs have been more edematous than usual for about a month. She has been on the same dose of furosemide for years, and she doesn't think it is helping anymore. She doesn't weigh herself regularly. She gets a severe \"smothering\" feeling when she is short of breath (patient clutches her chest). It does feel like an uncomfortable pressure. The patient was very dyspneic with walking in the ED and desaturated into the mid 80's. She hasn't been having any cough, fevers, or chills, and there were no infiltrates on lung imaging, but she was swabbed for COVID just to be sure. Hospital medicine was called for admission. Subjective: no chest pain today. SOB improving. LE edema improved.       Medications:  Reviewed    Infusion Medications   Scheduled Medications    albuterol sulfate HFA  2 puff Inhalation Q4H WA    furosemide  40 mg Oral Daily    dicyclomine  20 mg Oral Q6H    linaclotide  145 mcg Oral QAM AC    famotidine  20 mg Oral Daily    tiotropium  2 puff Inhalation Daily    metoprolol succinate  25 mg Oral Daily    spironolactone  25 mg Oral Daily    sodium chloride flush 10 mL Intravenous 2 times per day    warfarin (COUMADIN) daily dosing (placeholder)   Other RX Placeholder     PRN Meds: methocarbamol, albuterol, perflutren lipid microspheres, labetalol, magnesium sulfate, potassium chloride **OR** potassium alternative oral replacement **OR** potassium chloride, regadenoson, sodium chloride flush, acetaminophen **OR** acetaminophen, polyethylene glycol, prochlorperazine      Intake/Output Summary (Last 24 hours) at 8/23/2020 0957  Last data filed at 8/22/2020 1810  Gross per 24 hour   Intake 720 ml   Output 300 ml   Net 420 ml       Physical Exam Performed:    BP (!) 162/82   Pulse 63   Temp 98.1 °F (36.7 °C) (Axillary)   Resp 18   Ht 5' 8\" (1.727 m)   Wt 140 lb 6.9 oz (63.7 kg)   SpO2 93%   BMI 21.35 kg/m²     General appearance:  No apparent distress, appears stated age and cooperative. HEENT:  Normal cephalic, atraumatic without obvious deformity. Pupils equal, round, and reactive to light. Extra ocular muscles intact. Conjunctivae/corneas clear. Hard of hearing. Neck: Supple, with full range of motion. No jugular venous distention. Trachea midline. Respiratory:  Normal respiratory effort. Bilaterally without Wheezes/Rhonchi. Faint bibasilar rales. Not coughing. Cardiovascular:  Regular rate and rhythm with normal S1/S2 without murmurs, rubs or gallops. Abdomen: Soft, non-tender, non-distended with normal bowel sounds. Musculoskeletal:  No clubbing, cyanosis. 2+ BLE pitting edema. Full range of motion without deformity. Skin: Skin color, texture, turgor normal.  No rashes or lesions. Neurologic:  Neurovascularly intact without any focal sensory/motor deficits.  Cranial nerves: II-XII intact, grossly non-focal.  Psychiatric:  Alert and oriented, thought content appropriate, normal insight  Capillary Refill: Brisk,< 3 seconds   Peripheral Pulses: +2 palpable, equal bilaterally     Labs:   Recent Labs     08/21/20  1232 08/22/20  0452   WBC 6.2 5.3   HGB 13.3 12.9   HCT 39.9 38.5    177     Recent Labs     08/21/20  1232 08/22/20  0452    138   K 4.7 3.8   CL 99 96*   CO2 27 29   BUN 18 18   CREATININE 0.9 0.8   CALCIUM 10.7* 10.3     Recent Labs     08/21/20  1232   AST 21   ALT 8*   BILITOT 1.0   ALKPHOS 86     Recent Labs     08/21/20  1232 08/22/20  1233 08/23/20  0815   INR 2.81* 3.60* 3.74*     Recent Labs     08/21/20  1232 08/21/20  2239 08/22/20  0452   TROPONINI <0.01 <0.01 <0.01       Urinalysis:      Lab Results   Component Value Date    NITRU Negative 08/15/2017    WBCUA 0-2 08/15/2017    BACTERIA Rare 01/09/2014    RBCUA 3-5 08/15/2017    BLOODU TRACE-INTACT 08/15/2017    SPECGRAV 1.015 08/15/2017    GLUCOSEU Negative 08/15/2017       Radiology:  CT CHEST WO CONTRAST   Final Result   1. Small left pleural effusion. 2. No focal lung consolidation. 3. New indeterminate 6 mm right lower lobe pulmonary nodule. Follow-up chest   CT can be considered in 6-12 months. Follow-up should take into account the   patient's age and patient's comorbidities. 4. Minimal linear areas of atelectasis and scarring at the lung bases. RECOMMENDATIONS:   6.0 mm solid pulmonary nodule. Recommend a non-contrast Chest CT at 6-12   months, then consider an additional non-contrast Chest CT at 18-24 months. These guidelines do not apply to immunocompromised patients and patients with   cancer. Follow up in patients with significant comorbidities as clinically   warranted. For lung cancer screening, adhere to Lung-RADS guidelines. Reference: Radiology. 2017; 284(1):228-43. XR CHEST PORTABLE   Final Result   Cardiomegaly with chronic interstitial changes.   No definite infiltrate or   edema         NM Cardiac Stress Test Nuclear Imaging    (Results Pending)           Assessment/Plan:    Active Hospital Problems    Diagnosis    Chest pain [R07.9]    CHF (congestive heart failure), NYHA class I, acute on chronic, combined ([facility] [address] [I50.43]

## 2020-08-23 NOTE — PROGRESS NOTES
Attempted to obtain consent from patient's family via phone. Family had multiple questions that the writer answered, but family ended the conversation before giving consent. Writer will try again later.

## 2020-08-23 NOTE — CARE COORDINATION
Attempt to call pt rm and pt spouse for assessment- no answer at either. On chart review pt admitted with SOB, CP- CHF and pending stress test. Pt is from home with spouse, has insurance [employer] [place] and PCP.  CM history includes COA referral and Bridgette Latif in distance past. Pt on o2 in hospital and pending stress test.

## 2020-08-23 NOTE — PROGRESS NOTES
Pharmacy to Dose Warfarin     Dx: AFib  Goal INR range 2-3  Home Warfarin dose: 5mg daily     Date                 INR                  Warfarin  8/21                  2.81                   5 mg  8/22                  3.60                   Hold    8/23                  3.74                   Hold         Recommend holding warfarin tonight.  Daily INR ordered.  Rx will continue to manage therapy per consult order.     VEGA Aguirre [facility]

## 2020-08-23 NOTE — PROGRESS NOTES
Pt 97% room air. Pt confused. Pt unable to work effectively with MDI this shift. Pt has no wheezing/ no SOB.

## 2020-08-24 NOTE — PROGRESS NOTES
Pharmacy to Dose Warfarin     Dx: AFib  Goal INR range 2-3  Home Warfarin dose: 5mg daily     Date                 INR                  Warfarin  8/21                  2.81                   5 mg  8/22                  3.60                   Hold    8/23                  3.74                   Hold      8/24                  2.58                   5 mg                       Recommend warfarin 5mg X1 tonight . Daily INR ordered. Rx will continue to manage therapy per consult order.    Jessica Harding/Martina. 8/24/20 9:02 AM EDT

## 2020-08-24 NOTE — PROGRESS NOTES
Assessment complete. VSS. Bed in lowest position, call light within reach. Will continue to monitor.

## 2020-08-24 NOTE — PROGRESS NOTES
A alison myoview stress test was completed on this patient as ordered. The patient tolerated the procedure well. Awaiting stress imaging at this time.

## 2020-08-24 NOTE — PROGRESS NOTES
Hospitalist Progress Note      PCP: Jae Lomeli MD    Date of Admission: 8/21/2020    Chief Complaint: SOB, chest pain    Hospital Course: The patient is a pleasant 80 Y F with a h/o HTN, HLD, CAD, CHF, and Afib. She is in relatively good health, still mentally sharp, lives at home with her , walks around without assistance. For the last two months she has experienced progressive dyspnea, particularly with exertion. Initially she only noticed this when she was walking around the grocery store, but it has progressed to the point that even just walking from one side of the room to the other gets her very winded and she has to breath heavily for a couple minutes. When asked, she does say that lying flat makes her more dyspneic, and that lately she has been waking up in the middle of the night gasping for air. Her legs have been more edematous than usual for about a month. She has been on the same dose of furosemide for years, and she doesn't think it is helping anymore. She doesn't weigh herself regularly. She gets a severe \"smothering\" feeling when she is short of breath (patient clutches her chest). It does feel like an uncomfortable pressure. The patient was very dyspneic with walking in the ED and desaturated into the mid 80's. She hasn't been having any cough, fevers, or chills, and there were no infiltrates on lung imaging, but she was swabbed for COVID just to be sure. Hospital medicine was called for admission. Subjective:   Pt is on RA. Afebrile. VSS. No complaints currently. Anxious to go home.       Medications:  Reviewed    Infusion Medications   Scheduled Medications    albuterol sulfate HFA  2 puff Inhalation Q4H WA    furosemide  40 mg Oral Daily    dicyclomine  20 mg Oral Q6H    linaclotide  145 mcg Oral QAM AC    famotidine  20 mg Oral Daily    tiotropium  2 puff Inhalation Daily    metoprolol succinate  25 mg Oral Daily    spironolactone  25 mg Oral Daily    sodium chloride flush  10 mL Intravenous 2 times per day    warfarin (COUMADIN) daily dosing (placeholder)   Other RX Placeholder     PRN Meds: methocarbamol, albuterol, perflutren lipid microspheres, labetalol, magnesium sulfate, potassium chloride **OR** potassium alternative oral replacement **OR** potassium chloride, sodium chloride flush, acetaminophen **OR** acetaminophen, polyethylene glycol, prochlorperazine      Intake/Output Summary (Last 24 hours) at 8/24/2020 1839  Last data filed at 8/24/2020 0528  Gross per 24 hour   Intake 240 ml   Output 0 ml   Net 240 ml       Physical Exam Performed:    BP (!) 104/41 Comment: Pt just ambulted to bathroom  Pulse 107   Temp 98.4 °F (36.9 °C) (Oral)   Resp 20   Ht 5' 8\" (1.727 m)   Wt 139 lb 5.3 oz (63.2 kg)   SpO2 90% Comment: Patient just ambulated to bathroom  BMI 21.19 kg/m²     General appearance:  No apparent distress, appears stated age and cooperative. HEENT:  Normal cephalic, atraumatic without obvious deformity. Pupils equal, round, and reactive to light. Extra ocular muscles intact. Conjunctivae/corneas clear. Hard of hearing. Neck: Supple, with full range of motion. No jugular venous distention. Trachea midline. Respiratory:  Normal respiratory effort. Bilaterally without Wheezes/Rhonchi. Faint bibasilar rales. Not coughing. Cardiovascular:  Regular rate and rhythm with normal S1/S2 without murmurs, rubs or gallops. Abdomen: Soft, non-tender, non-distended with normal bowel sounds. Musculoskeletal:  No clubbing, cyanosis. 2+ BLE pitting edema. Full range of motion without deformity. Skin: Skin color, texture, turgor normal.  No rashes or lesions. Neurologic:  Neurovascularly intact without any focal sensory/motor deficits.  Cranial nerves: II-XII intact, grossly non-focal.  Psychiatric:  Alert and oriented, thought content appropriate, normal insight  Capillary Refill: Brisk,< 3 seconds   Peripheral Pulses: +2 palpable, equal bilaterally     Labs:   Recent Labs     08/22/20  0452   WBC 5.3   HGB 12.9   HCT 38.5        Recent Labs     08/22/20  0452      K 3.8   CL 96*   CO2 29   BUN 18   CREATININE 0.8   CALCIUM 10.3     No results for input(s): AST, ALT, BILIDIR, BILITOT, ALKPHOS in the last 72 hours. Recent Labs     08/22/20  1233 08/23/20  0815 08/24/20  0705   INR 3.60* 3.74* 2.58*     Recent Labs     08/21/20  2239 08/22/20  0452   TROPONINI <0.01 <0.01       Urinalysis:      Lab Results   Component Value Date    NITRU Negative 08/15/2017    WBCUA 0-2 08/15/2017    BACTERIA Rare 01/09/2014    RBCUA 3-5 08/15/2017    BLOODU TRACE-INTACT 08/15/2017    SPECGRAV 1.015 08/15/2017    GLUCOSEU Negative 08/15/2017       Radiology:  NM Cardiac Stress Test Nuclear Imaging   Final Result      CT CHEST WO CONTRAST   Final Result   1. Small left pleural effusion. 2. No focal lung consolidation. 3. New indeterminate 6 mm right lower lobe pulmonary nodule. Follow-up chest   CT can be considered in 6-12 months. Follow-up should take into account the   patient's age and patient's comorbidities. 4. Minimal linear areas of atelectasis and scarring at the lung bases. RECOMMENDATIONS:   6.0 mm solid pulmonary nodule. Recommend a non-contrast Chest CT at 6-12   months, then consider an additional non-contrast Chest CT at 18-24 months. These guidelines do not apply to immunocompromised patients and patients with   cancer. Follow up in patients with significant comorbidities as clinically   warranted. For lung cancer screening, adhere to Lung-RADS guidelines. Reference: Radiology. 2017; 284(1):228-43. XR CHEST PORTABLE   Final Result   Cardiomegaly with chronic interstitial changes.   No definite infiltrate or   edema             Assessment/Plan:    Active Hospital Problems    Diagnosis    Chest pain [R07.9]    CHF (congestive heart failure), NYHA class I, acute on chronic, combined ([facility] [address] [I50.43]       Acute on

## 2020-08-25 NOTE — PROGRESS NOTES
Hospitalist Progress Note      PCP: Renetta Alston MD    Date of Admission: 8/21/2020    Chief Complaint: SOB, chest pain    Hospital Course: The patient is a pleasant 80 Y F with a h/o HTN, HLD, CAD, CHF, and Afib. She is in relatively good health, still mentally sharp, lives at home with her , walks around without assistance. For the last two months she has experienced progressive dyspnea, particularly with exertion. Initially she only noticed this when she was walking around the grocery store, but it has progressed to the point that even just walking from one side of the room to the other gets her very winded and she has to breath heavily for a couple minutes. When asked, she does say that lying flat makes her more dyspneic, and that lately she has been waking up in the middle of the night gasping for air. Her legs have been more edematous than usual for about a month. She has been on the same dose of furosemide for years, and she doesn't think it is helping anymore. She doesn't weigh herself regularly. She gets a severe \"smothering\" feeling when she is short of breath (patient clutches her chest). It does feel like an uncomfortable pressure. The patient was very dyspneic with walking in the ED and desaturated into the mid 80's. She hasn't been having any cough, fevers, or chills, and there were no infiltrates on lung imaging, but she was swabbed for COVID just to be sure. Hospital medicine was called for admission. Subjective:   Pt is on RA. Afebrile. VSS. Pt combative/agitated overnight requiring wrist restraints. She is alert to self. She will not cooperate with answering questions. Pt's  is at bedside.      Medications:  Reviewed    Infusion Medications   Scheduled Medications    warfarin  4 mg Oral Once    QUEtiapine  50 mg Oral Nightly    albuterol sulfate HFA  2 puff Inhalation Q4H WA    furosemide  40 mg Oral Daily    dicyclomine  20 mg Oral Q6H    linaclotide 145 mcg Oral QAM AC    famotidine  20 mg Oral Daily    tiotropium  2 puff Inhalation Daily    metoprolol succinate  25 mg Oral Daily    spironolactone  25 mg Oral Daily    sodium chloride flush  10 mL Intravenous 2 times per day    warfarin (COUMADIN) daily dosing (placeholder)   Other RX Placeholder     PRN Meds: haloperidol lactate, methocarbamol, albuterol, perflutren lipid microspheres, labetalol, magnesium sulfate, potassium chloride **OR** potassium alternative oral replacement **OR** potassium chloride, sodium chloride flush, acetaminophen **OR** acetaminophen, polyethylene glycol, prochlorperazine      Intake/Output Summary (Last 24 hours) at 8/25/2020 1502  Last data filed at 8/24/2020 1858  Gross per 24 hour   Intake 100 ml   Output --   Net 100 ml       Physical Exam Performed:    BP (!) 143/62   Pulse 63   Temp 97.6 °F (36.4 °C) (Oral)   Resp 16   Ht 5' 8\" (1.727 m)   Wt 139 lb 5.3 oz (63.2 kg)   SpO2 97%   BMI 21.19 kg/m²     General appearance:  No apparent distress, appears stated age and cooperative. HEENT:  Normal cephalic, atraumatic without obvious deformity. Pupils equal, round, and reactive to light. Extra ocular muscles intact. Conjunctivae/corneas clear. Hard of hearing. Neck: Supple, with full range of motion. No jugular venous distention. Trachea midline. Respiratory:  Normal respiratory effort. Bilaterally without Wheezes/Rhonchi. Faint bibasilar rales. Not coughing. Cardiovascular:  Regular rate and rhythm with normal S1/S2 without murmurs, rubs or gallops. Abdomen: Soft, non-tender, non-distended with normal bowel sounds. Musculoskeletal:  No clubbing, cyanosis. No edema. Full range of motion without deformity. Skin: Skin color, texture, turgor normal.  No rashes or lesions. Neurologic:  Neurovascularly intact without any focal sensory/motor deficits.  Cranial nerves: II-XII intact, grossly non-focal.  Psychiatric:  Alert to self, pt agitated, unwilling to answer questions. Capillary Refill: Brisk,< 3 seconds   Peripheral Pulses: +2 palpable, equal bilaterally     Labs:   No results for input(s): WBC, HGB, HCT, PLT in the last 72 hours. No results for input(s): NA, K, CL, CO2, BUN, CREATININE, CALCIUM, PHOS in the last 72 hours. Invalid input(s): MAGNES  No results for input(s): AST, ALT, BILIDIR, BILITOT, ALKPHOS in the last 72 hours. Recent Labs     08/23/20  0815 08/24/20  0705 08/25/20  0638   INR 3.74* 2.58* 2.45*       Urinalysis:      Lab Results   Component Value Date    NITRU Negative 08/15/2017    WBCUA 0-2 08/15/2017    BACTERIA Rare 01/09/2014    RBCUA 3-5 08/15/2017    BLOODU TRACE-INTACT 08/15/2017    SPECGRAV 1.015 08/15/2017    GLUCOSEU Negative 08/15/2017       Radiology:  NM Cardiac Stress Test Nuclear Imaging   Final Result      CT CHEST WO CONTRAST   Final Result   1. Small left pleural effusion. 2. No focal lung consolidation. 3. New indeterminate 6 mm right lower lobe pulmonary nodule. Follow-up chest   CT can be considered in 6-12 months. Follow-up should take into account the   patient's age and patient's comorbidities. 4. Minimal linear areas of atelectasis and scarring at the lung bases. RECOMMENDATIONS:   6.0 mm solid pulmonary nodule. Recommend a non-contrast Chest CT at 6-12   months, then consider an additional non-contrast Chest CT at 18-24 months. These guidelines do not apply to immunocompromised patients and patients with   cancer. Follow up in patients with significant comorbidities as clinically   warranted. For lung cancer screening, adhere to Lung-RADS guidelines. Reference: Radiology. 2017; 284(1):228-43. XR CHEST PORTABLE   Final Result   Cardiomegaly with chronic interstitial changes.   No definite infiltrate or   edema             Assessment/Plan:    Active Hospital Problems    Diagnosis    Chest pain [R07.9]    CHF (congestive heart failure), NYHA class I, acute on chronic, combined ([address] [I50.43]       Acute on chronic diastolic heart failure  - last ECHO 2/2013 with EF 55%  - updated ECHO shows normal LVEF with G2DD  - she has been transitioned from IV lasix to PO  - continue home aldactone, lasix and metoprolol. Unable to due ACEi/ARB    Chest pain, no known CAD  - EKG, troponin trend negative, currently chest pain free  - stress test abnormal, cardiology consulted for further recommendations -- appreciate     Acute hypoxic respiratory failure (resolved)  - 2/2 CHF, COPD, frailty, h/o lobectomy  - supplemental O2 has been weaned off     Acute metabolic encephalopathy  - was noted earlier in pt's hospitalization but seemed to have resolved. Pt was alert and making appropriate conversation on 8/24. Pt became agitated overnight requiring seroquel and wrist restraints. Suspect that this may be due to hospital acquired delirium. Continue seroquel and haldol prn. Expand workup with CT head, CBC/CMP and UA/culture to exclude other causes.      Paroxysmal Afib  - rate controlled with metoprolol  - continue coumadin, pharmacy to dose    Hypertension  - variable control   - continue home metoprolol    COPD  - no evidence of exacerbation  - continue inhaled bronchodilator therapy     DVT Prophylaxis: coumadin  Diet: DIET GENERAL;  Code Status: Full Code    PT/OT Eval Status: not indicated     Dispo - 1-2 days     TIMBO Khan - CNP

## 2020-08-25 NOTE — CARE COORDINATION
CASE MANAGEMENT INITIAL ASSESSMENT      Reviewed chart and met with patient today to inform of social work role, discuss dc options and plan of care, community resources and support. Explained Case Management role/services. Family present: spouse at bedsie  Family supportive and help when able/as needed. Primary contact information: Antwon Whipple, [phone]    Admit date/status: 8-  Diagnosis: CHF  Is this a Readmission?:  N    Insurance: Medicare  Precert required for SNF -    N     3 night stay required - Y    Living arrangements, Adls, care needs, prior to admission: spouse states patient normally able to care for most needs; states current medical status not her norm; states he wants her to go home at dc and she has never been to SNF or other inpatient dc/rehab setting. Unclear of plan at this time and will follow closely. Transportation: Spouse typically drives    [address] [employer] at home: spouse states patient normally independent and no DME in use. Walker__Cane__RTS__ BSC__Shower Chair__  02__ HHN__ CPAP__  BiPap__  Hospital Bed__ W/C___ Other__________    Services in the home and/or outpatient, prior to admission: none current    Dialysis Facility: n/a  · Name:  · Address:  · Dialysis Schedule:  · Phone:  · Fax:    PT/OT recs: await any recs but spouse states strong preference for home at dc; receptive to [address] if patient's mentation clears. Hospital Exemption Notification (HEN): N/A    Barriers to discharge: None identified; spouse supportive and wants patient to return home at dc; receptive to [address] but does not want SNF; will need to follow along for recs and progress. Plan/comments: will monitor closely for progress and confirm plan; spouse states this is not patient's normal mental status and is very concerned; await MD input and recs.      ECOC on chart for MD signature
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
## 2020-08-25 NOTE — PROGRESS NOTES
Pharmacy to Dose Warfarin     Dx: AFib  Goal INR range 2-3  Home Warfarin dose: 5mg daily     Date                 INR                  Warfarin  8/21                  2.81                   5 mg  8/22                  3.60                   Hold    8/23                  3.74                   Hold      8/24                  2.58                   5 mg        8/25                  2.45                   4 mg                         Recommend warfarin 4 mg X1 tonight . Daily INR ordered. Rx will continue to manage therapy per consult order.     Jessica Harding/Martina. 8/25/20 11:26 AM EDT
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
## 2020-08-25 NOTE — PROGRESS NOTES
RN attempted to check and change patient and give water and snacks. Patient refused, not allowing RN to change her.  Will continue to monitor  Arlene Rodriguez

## 2020-08-25 NOTE — CONSULTS
Historical Provider, MD   furosemide (LASIX) 40 MG tablet Take 1 tablet by mouth daily. 7/11/13   Garrison Baxter MD   potassium chloride SA (K-DUR;KLOR-CON M) 20 MEQ tablet Take 1 tablet by mouth daily. 7/11/13   Garrison Baxter MD   albuterol (PROVENTIL) (2.5 MG/3ML) 0.083% nebulizer solution Take 3 mLs by nebulization every 4 hours as needed. 2/26/13   Garrison Baxter MD   furosemide (LASIX) 40 MG tablet Take 1 tablet by mouth daily. 12/12/11 12/11/12  Gideon Lazo MD   esomeprazole (NEXIUM) 40 MG capsule Take 1 capsule by mouth daily for 15 days. 10/7/11 10/22/11  Gideon Lazo MD   Multiple Vitamin (MULTIVITAMINS PO) Take 1 tablet by mouth daily. 5/21/10   Historical Provider, MD   Calcium-Vitamin D (CALTRATE 600 PLUS-VIT D PO) Take 1 tablet by mouth daily. 5/21/10   Historical Provider, MD        Allergies:  Latex; Advair [fluticasone-salmeterol]; Alendronate sodium; [place]; Avelox [moxifloxacin hcl in nacl]; Carafate [sucralfate]; Ciprofloxacin; Clindamycin/lincomycin; Crestor [rosuvastatin]; Erythromycin; Flagyl [metronidazole]; Gemfibrozil; Iodine; Lipitor [atorvastatin]; Penicillins; Pravachol [pravastatin sodium]; Prednisone; Sulfa antibiotics;  Benicar [olmesartan medoxomil]; and Lisinopril     Review of Systems:   12 point ROS negative in all areas as listed below except as in [place]  Constitutional, EENT, Cardiovascular, pulmonary, GI, , Musculoskeletal, skin, neurological, hematological, endocrine, Psychiatric    Physical Examination:    Vitals:    08/25/20 0845   BP: (!) 143/62   Pulse: 63   Resp: 16   Temp: 97.6 °F (36.4 °C)   SpO2: 97%    Weight: 139 lb 5.3 oz (63.2 kg)         General Appearance:  Somnolent; decreased mental status; only followed open eyes on command   Head:  Normocephalic, without obvious abnormality, atraumatic   Eyes:  PERRL, conjunctiva/corneas clear       Nose: Nares normal, no drainage or sinus tenderness   Throat: Lips, mucosa, and tongue normal Neck: Supple, symmetrical, trachea midline, no adenopathy, thyroid: not enlarged, symmetric, no tenderness/mass/nodules, no carotid bruit or JVD       Lungs:   Clear to auscultation bilaterally, respirations unlabored   Chest Wall:  No tenderness or deformity   Heart:  Regular rate and rhythm, S1, S2 normal, no murmur, rub or gallop   Abdomen:   Soft, non-tender, bowel sounds active all four quadrants,  no masses, no organomegaly           Extremities: Extremities normal, atraumatic, no cyanosis or edema   Pulses: 2+ and symmetric   Skin: Skin color, texture, turgor normal, no rashes or lesions   Pysch: Unable to assess   Neurologic: Non-focal.         Labs  CBC:   Lab Results   Component Value Date    WBC 5.3 08/22/2020    RBC 4.08 08/22/2020    HGB 12.9 08/22/2020    HCT 38.5 08/22/2020    MCV 94.4 08/22/2020    RDW 13.3 08/22/2020     08/22/2020     CMP:    Lab Results   Component Value Date     08/22/2020    K 3.8 08/22/2020    CL 96 08/22/2020    CO2 29 08/22/2020    BUN 18 08/22/2020    CREATININE 0.8 08/22/2020    GFRAA >60 08/22/2020    GFRAA >60 02/16/2013    AGRATIO 1.7 08/21/2020    LABGLOM >60 08/22/2020    GLUCOSE 90 08/22/2020    PROT 7.3 08/21/2020    PROT 6.7 11/06/2012    CALCIUM 10.3 08/22/2020    BILITOT 1.0 08/21/2020    ALKPHOS 86 08/21/2020    AST 21 08/21/2020    ALT 8 08/21/2020     PT/INR:  No results found for: PTINR  Lab Results   Component Value Date    CKMB <0.2 02/17/2013    TROPONINI <0.01 08/22/2020       EKG:  I have reviewed EKG with the following interpretation:  Impression:  See HPI    06/05/2015 Echo: Normal EF, moderate MR, severe TR  11/14/2016 Echo: Normal EF, Moderate MR, Moderate TR  07/23/2019 Echo: EF 50-55%, mild AI, marked LAE, moderate TR.     ECHO 8/24/20 Summary   Normal left ventricular systolic function with ejection fraction of 55-60%. No regional wall motion abnormalites are seen. Mild concentric left ventricular hypertrophy.    Grade II diastolic dysfunction with elevated filling pressure. Moderate mitral and tricuspid regurgitation. Mild aortic regurgitation. Systolic pulmonic artery pressure (SPAP) is estimated at 53 mmHg consistent   with moderate pulmonary hypertension (Right atrial pressure of 3 mmHg). The right atrium is mildly dilated. Lexiscan myoview 8/24/20       Summary     Abnormal low risk myocardial perfusion study. Janett Garnett is a small fixed distal anterior and apical defect consistent with     attenuation vs infarct.    Madeline Mckeon is no ischemia.     Normal left ventricular size, wall motion, and function.     The estimated left ventricular function is 66%.             Assessment:  Seb Calzada is a 80 y.o. patient who presented to [facility] 8/21/20 with c/o SOB. History from chart and  as she cannot give history. Follows with Dr. Leandra Jorge at [facility] (last OV 7/10/20). She has PMH of atrial fibrillation on coumadin, COPD, HTN, HLD, s/p pacemaker, GERD, diverticulosis, and lung cancer. Note CHF and CAD mentioned in [address] below but Dr. Leandra Jorge makes no mention and no specific records. Note most recent ECHO 7/19 showed EF=50-55%; mild AI; marked LAE; moderate TR (prior 6/15 and 11/16 EF normal with moderate MR/TR). Now  reports SOB starting 4 days ago. She c/o SOB and appeared SOB to him. Reported DE OLIVEIRA walking in grocery store and some swelling in lower extremities. In ER O2 sat decreased into mid-80's and admitted. Admit EKG showed V-paced 80bpm (2/16 EKG V-paced; afib; PVC's). Note DGD=3379 (1768 in 2/16); 3 negative Ernestina enzymes; INR=2.45; H/H=12.9/38.5; BUN/Cr=19/0.8; K+  3.8. Note CT imaging with no ASD and small left pleural effusion. Note she is currently unable to give history or follow commands other than open eyes. She did try to talk and said \"feel bad all over\" but no specifics. Diagnosis of SOB and abnormal nuc imaging study in elderly female with hx chronic afib, , HTN, HLD.  She has ruled out for MI. Not certain of etiology of SOB but no obvious cardiac issue at present. Recs:  1. I personally reviewed [address] [place] [address] from 8/24/20 and no concerning issues for ischemia. Small fixed apical defect may be artifact given normal EF and no wall motion abnls on ECHO. Would not pursue invasive cath based on this test, elderly age, and her current depressed mental status. Low risk abnormal study. 2. Note ECHO 8/24/20 with normal EF=55-60%; mild LVH; grade II DD with elevated filling pressure; moderate TR/MR; moderate pulm HTN 53mmHg. 3. Consider neurology evaluation for mental status. Defer to IM team.  4. Continue aldactone 25mg qd, toprol XL 25mg qd, lasix 40mg qd, and warfarin as directed if able to take po. Signing off and please call if further issues or questions arise. Thank you    Patient Active Problem List   Diagnosis    Headache    COPD (chronic obstructive pulmonary disease) ([address]    Lung cancer ([address]    HTN (hypertension)    Osteoporosis    Hiatal hernia    CHF (congestive heart failure) (HCC)    Syncopal episodes    COPD exacerbation (HCC)    PNA (pneumonia)    Respiratory failure with hypoxia ([address]    Atrial fibrillation ([address]    Chest pain    CHF (congestive heart failure), NYHA class I, acute on chronic, combined ([address]       Thank you for allowing to us to participate in the care or Suma Hernandez. Further evaluation will be based upon the patient's clinical course and testing results.

## 2020-08-26 NOTE — PROGRESS NOTES
Physical Therapy  PT/OT referral received and appreciated. Pt very confused and agitated, unwilling to participate in evaluation.  present and reported pt was indep prior to admit, no reported falls. Will follow up as schedule permits this date or 8/27 to complete evaluation as pt is able.      Pooja Hairston, PT  Rachana Fong, OT

## 2020-08-26 NOTE — PROGRESS NOTES
Dr Erika Holt added to the treatment team for Neurology on 8/26/2020 @ [phone]. Liam Chapa.    Gisela Finely

## 2020-08-26 NOTE — PROGRESS NOTES
Pharmacy to Dose Warfarin     Dx: AFib  Goal INR range 2-3  Home Warfarin dose: 5mg daily     Date                 INR                  Warfarin  8/21                  2.81                   5 mg  8/22                  3.60                   Hold    8/23                  3.74                   Hold      8/24                  2.58                   5 mg        8/25                  2.45                   4 mg                      8/26                  3.03                   Hold   Recommend to hold warfarin X1 tonight . Daily INR ordered. Rx will continue to manage therapy per consult order.     Jessica Harding/Martina. 8/26/20 1:30 PM EDT

## 2020-08-26 NOTE — PROGRESS NOTES
Hospitalist Progress Note      PCP: Charline Andre MD    Date of Admission: 8/21/2020    Chief Complaint: SOB, chest pain    Hospital Course: The patient is a pleasant 80 Y F with a h/o HTN, HLD, CAD, CHF, and Afib. She is in relatively good health, still mentally sharp, lives at home with her , walks around without assistance. For the last two months she has experienced progressive dyspnea, particularly with exertion. Initially she only noticed this when she was walking around the grocery store, but it has progressed to the point that even just walking from one side of the room to the other gets her very winded and she has to breath heavily for a couple minutes. When asked, she does say that lying flat makes her more dyspneic, and that lately she has been waking up in the middle of the night gasping for air. Her legs have been more edematous than usual for about a month. She has been on the same dose of furosemide for years, and she doesn't think it is helping anymore. She doesn't weigh herself regularly. She gets a severe \"smothering\" feeling when she is short of breath (patient clutches her chest). It does feel like an uncomfortable pressure. The patient was very dyspneic with walking in the ED and desaturated into the mid 80's. She hasn't been having any cough, fevers, or chills, and there were no infiltrates on lung imaging, but she was swabbed for COVID just to be sure. Hospital medicine was called for admission. Subjective:   Pt is on RA. Afebrile. VSS. Pt remains agitated. Refusing care. She is confused. Pt's  is at bedside.      Medications:  Reviewed    Infusion Medications    sodium chloride       Scheduled Medications    famotidine (PEPCID) injection  20 mg Intravenous Daily    QUEtiapine  50 mg Oral Nightly    albuterol sulfate HFA  2 puff Inhalation Q4H WA    dicyclomine  20 mg Oral Q6H    linaclotide  145 mcg Oral QAM AC    tiotropium  2 puff Inhalation Daily    metoprolol succinate  25 mg Oral Daily    sodium chloride flush  10 mL Intravenous 2 times per day    warfarin (COUMADIN) daily dosing (placeholder)   Other RX Placeholder     PRN Meds: metoprolol, albuterol, haloperidol lactate, methocarbamol, albuterol, perflutren lipid microspheres, sodium chloride flush, acetaminophen **OR** acetaminophen, polyethylene glycol, prochlorperazine      Intake/Output Summary (Last 24 hours) at 8/26/2020 1414  Last data filed at 8/26/2020 1352  Gross per 24 hour   Intake 110 ml   Output 0 ml   Net 110 ml       Physical Exam Performed:    BP (!) 162/70   Pulse 71   Temp 96 °F (35.6 °C) (Axillary)   Resp 18   Ht 5' 8\" (1.727 m)   Wt 141 lb 1.5 oz (64 kg)   SpO2 97%   BMI 21.45 kg/m²     General appearance:  No apparent distress, appears stated age and cooperative. HEENT:  Normal cephalic, atraumatic without obvious deformity. Pupils equal, round, and reactive to light. Extra ocular muscles intact. Conjunctivae/corneas clear. Hard of hearing. Neck: Supple, with full range of motion. No jugular venous distention. Trachea midline. Respiratory:  Normal respiratory effort. Bilaterally without Wheezes/Rhonchi. Faint bibasilar rales. Not coughing. Cardiovascular:  Regular rate and rhythm with normal S1/S2 without murmurs, rubs or gallops. Abdomen: Soft, non-tender, non-distended with normal bowel sounds. Musculoskeletal:  No clubbing, cyanosis. No edema. Full range of motion without deformity. Skin: Skin color, texture, turgor normal.  No rashes or lesions. Neurologic:  Neurovascularly intact without any focal sensory/motor deficits. Cranial nerves: II-XII intact, grossly non-focal.  Psychiatric:  Alert to self, pt agitated, unwilling to answer questions.    Capillary Refill: Brisk,< 3 seconds   Peripheral Pulses: +2 palpable, equal bilaterally     Labs:   Recent Labs     08/25/20  1542   WBC 7.4   HGB 14.4   HCT 42.6        Recent Labs 08/25/20  1542 08/26/20  1039    137   K 3.8 3.8   CL 90* 92*   CO2 33* 28   BUN 27* 32*   CREATININE 1.1 1.0   CALCIUM 10.8* 10.7*     Recent Labs     08/25/20  1542   AST 27   ALT 10   BILITOT 1.8*   ALKPHOS 94     Recent Labs     08/24/20  0705 08/25/20  0638 08/26/20  0735   INR 2.58* 2.45* 3.03*       Urinalysis:      Lab Results   Component Value Date    NITRU Negative 08/15/2017    WBCUA 0-2 08/15/2017    BACTERIA Rare 01/09/2014    RBCUA 3-5 08/15/2017    BLOODU TRACE-INTACT 08/15/2017    SPECGRAV 1.015 08/15/2017    GLUCOSEU Negative 08/15/2017       Radiology:  CT HEAD WO CONTRAST   Final Result   No acute intracranial abnormality. NM Cardiac Stress Test Nuclear Imaging   Final Result      CT CHEST WO CONTRAST   Final Result   1. Small left pleural effusion. 2. No focal lung consolidation. 3. New indeterminate 6 mm right lower lobe pulmonary nodule. Follow-up chest   CT can be considered in 6-12 months. Follow-up should take into account the   patient's age and patient's comorbidities. 4. Minimal linear areas of atelectasis and scarring at the lung bases. RECOMMENDATIONS:   6.0 mm solid pulmonary nodule. Recommend a non-contrast Chest CT at 6-12   months, then consider an additional non-contrast Chest CT at 18-24 months. These guidelines do not apply to immunocompromised patients and patients with   cancer. Follow up in patients with significant comorbidities as clinically   warranted. For lung cancer screening, adhere to Lung-RADS guidelines. Reference: Radiology. 2017; 284(1):228-43. XR CHEST PORTABLE   Final Result   Cardiomegaly with chronic interstitial changes.   No definite infiltrate or   edema             Assessment/Plan:    Active Hospital Problems    Diagnosis    Shortness of breath [R06.02]    Abnormal nuclear cardiac imaging test [R93.1]    Chest pain [R07.9]    CHF (congestive heart failure), NYHA class I, acute on chronic, combined ([facility] [I50.43] Acute on chronic diastolic heart failure (resolved)  - Last ECHO 2/2013 with EF 55%, updated ECHO shows normal LVEF with G2DD.  - Improved with IV diuresis then switched to PO. Continue metoprolol. Unable to due ACEi/ARB. Hold diuretics today as pt has very poor oral intake as this time. Chest pain (resolved)  - EKG, troponin trend negative, currently chest pain free. - Stress test abnormal. Cardiology consulted -- no further workup at this time. Acute hypoxic respiratory failure (resolved)  - 2/2 CHF, COPD, frailty, h/o lobectomy.  - Supplemental O2 has been weaned off     Acute metabolic encephalopathy (resolved but then reoccurred)   - Was noted earlier in pt's hospitalization but seemed to have resolved. Pt was alert and making appropriate conversation on 8/24. Pt became agitated the evening of 8/25 requiring seroquel and wrist restraints. Suspect that this may be due to hospital acquired delirium. Continue seroquel and haldol prn. CT head negative. Obtain UA. Consult Neurology for further assistance -- appreciate. Paroxysmal atrial fibrillation  - Rate controlled with metoprolol.  - Continue coumadin, daily INR, pharmacy to dose. Hypertension  - Variable control.  - Continue home metoprolol. PRN IV labetalol ordered. Chronic obstructive pulmonary disease   - No evidence of exacerbation.  - Continue inhaled bronchodilator therapy.     DVT Prophylaxis: coumadin  Diet: DIET GENERAL;  Code Status: Full Code    PT/OT Eval Status: ordered     Dispo - 1-2 days     [facility], APRN - CNP

## 2020-08-27 NOTE — CONSULTS
[address]      Sravani Degroot 5/9/1927    History:  Past Medical History:   Diagnosis Date    Allergic rhinitis     Arthritis     states she has Osteo arthritis    Atrial fibrillation (HCC)     Dr. Estiven Gaines CAD (coronary artery disease)     states AFib, 2 valves seeping, pacemaker    CHF (congestive heart failure) ([facility] [address]     states she was dx with it once    COPD (chronic obstructive pulmonary disease) ([facility] [address] [facility]     severe 6/09, moderately Severe 12/11 PFT    Diverticulosis     GERD (gastroesophageal reflux disease)     states she goes to [employer])     Hiatal hernia     gastritis    HTN     states 2 years ago and on medication for it    Hyperlipidemia     states she has a history not on medication now    Lung cancer [facility]) 1997    right middle lobectomy    Macular degeneration     right eye    Osteoporosis     Dr Sherre Nageotte Pneumonia     Pulmonary Hypertension        ECHO: 55%    Discharge plans: home with spouse + [facility]    Family Present: Spouse    Advanced Directives: patient does not have advance directives and declines assistance completing them at this time    Patient's stated goal of care: spoke with her , he is agreeable to reducing sodium in their diets      Patient's current functional capacity:  Slight limitation of physical activity. Comfortable at rest. Ordinary physical activity results in fatigue, palpitation, dyspnea. Last three weights hospital weights reviewed:    Patient Vitals for the past 96 hrs (Last 3 readings):   Weight   08/26/20 0316 141 lb 1.5 oz (64 kg)   08/24/20 0528 139 lb 5.3 oz (63.2 kg)       Patient provided with both written and verbal education on CHF signs/symptoms, causes, discharge medications, daily weights, low sodium diet, activity, and follow-up. HF zone self management written instructions provided/reviewed and advised to call MD when in \"yellow\" zone.      Instructed them to call the doctor post discharge if they experiences increasing worsening shortness of breath, worsening chest pains, increased swelling from their baseline, worsening cough, or weight gain of >2- 3 lbs in a day/ 5 lb gain in a week. Also advised to call the doctor if they feels dizzy, increased fatigue, decreased or difficulty urinating. Instructed on and emphasized importance of scheduled hospital follow-up appointment with Dr. Chelsea Pradhan MD on 08/31/2020 at 2:40pm.      Mr. Janie Redd verbalized understanding. No additional questions at this time. Stated he will alert nurse with any questions. PATIENT/CAREGIVER TEACHING:    Level of patient/caregiver understanding able to:   [ X ] Verbalize understanding [ ] Demonstrate understanding [ ] Teach back   [ X ] Needs reinforcement [ ] Other:     Education Time: 15 minutes    Recommendations:   1. Encourage follow-up appointment compliance. Next appointment: 8/31  2. Educate further on fluid restriction of <64oz during inpatient stay so they can understand how to measure intake at home. 3. Review sodium restrictions. Encouraged to not add table salt to their foods and avoid foods that are high in sodium. 4. Continue to educate on S/S. Stress the importance of calling the MD with the earliest signs of an acute exacerbation. 5. Emphasize daily weights - instructed to call the MD if the patient gains 3 lb in a day or 5 lb in a week. 6. Provided patient with CHF Resource Line for questions and concerns.      Tequila MEANS BSN-RN  Heart Failure Navigator  [address] [place] [address]  [phone]

## 2020-08-27 NOTE — DISCHARGE INSTR - COC
Continuity of Care Form    Patient Name: Mita Lyman   :  1927  MRN:  [id number]    Admit date:  2020  Discharge date:  20    Code Status Order: Full Code   Advance Directives:   [facility] Documentation     Date/Time Healthcare Directive Type of Healthcare Directive Copy in [facility] [address] Agent's Name Healthcare Agent's Phone Number    [phone]  Yes, patient has an advance directive for healthcare treatment  --  --  --  --  --          Admitting Physician:  Jerry Gray MD  PCP: Charlotta Cranker, MD    Discharging Nurse: [facility] Unit/Room#: 3291/4208-79  Discharging Unit Phone Number: [phone]    Emergency Contact:   Extended Emergency Contact Information  Primary Emergency Contact: [facility] [address] [facility]  Address: [address] [place] [address] Dr Christy Hallman [address] Phone: [phone]  Work Phone: [phone]  Relation: Spouse  Secondary Emergency Contact: Belen Waters  Home Phone: [phone]  Relation: Other    Past Surgical History:  Past Surgical History:   Procedure Laterality Date    APPENDECTOMY      CATARACT REMOVAL      right    CHOLECYSTECTOMY      COLONOSCOPY  10/07    COLONOSCOPY  2014    polyps, diverticulosis    LUNG REMOVAL, PARTIAL  1997    right middle    PACEMAKER INSERTION      SKIN BIOPSY      states negative one off of the face    UPPER GASTROINTESTINAL ENDOSCOPY  10/2011    UPPER GASTROINTESTINAL ENDOSCOPY  2014    Dilated esophagus       Immunization History:   Immunization History   Administered Date(s) Administered    Influenza A (P2H7-64) Vaccine PF IM 2009    Influenza Virus Vaccine 2012    Influenza Whole 2009, 2011    Pneumococcal Conjugate 7-valent (Prevnar7) 2012    Pneumococcal Polysaccharide ([id number]) 10/14/2009    Td, unspecified formulation 2011    Zoster Live (Zostavax) 2009       Active Problems:  Patient Active Problem List   Diagnosis Code    Headache R51    COPD (chronic obstructive pulmonary disease) ([facility] [address] J44.9    Lung cancer ([facility]) C34.90    HTN (hypertension) I10    Osteoporosis M81.0    Hiatal hernia K44.9    CHF (congestive heart failure) ([facility]) I50.9    Syncopal episodes R55    COPD exacerbation ([facility]) J44.1    PNA (pneumonia) J18.9    Respiratory failure with hypoxia ([facility]) J96.91    Atrial fibrillation ([facility]) I48.91    Chest pain R07.9    CHF (congestive heart failure), NYHA class I, acute on chronic, combined ([facility]) I50.43    Shortness of breath R06.02    Abnormal nuclear cardiac imaging test R93.1       Isolation/Infection:   Isolation          No Isolation        Patient Infection Status     Infection Onset Added Last Indicated Last Indicated By Review Planned Expiration Resolved Resolved By    None active    Resolved    COVID-19 Rule Out 08/21/20 08/21/20 08/21/20 COVID-19 (Ordered)   08/22/20 Rule-Out Test Resulted          Nurse Assessment:  Last Vital Signs: BP (!) 182/76   Pulse 63   Temp 97.5 °F (36.4 °C) (Oral)   Resp 16   Ht 5' 8\" (1.727 m)   Wt 141 lb 1.5 oz (64 kg)   SpO2 97%   BMI 21.45 kg/m²     Last documented pain score (0-10 scale): Pain Level: 0  Last Weight:   Wt Readings from Last 1 Encounters:   08/26/20 141 lb 1.5 oz (64 kg)     Mental Status:  disoriented    IV Access:  - None    Nursing Mobility/ADLs:  Walking   Assisted  Transfer  Assisted  Bathing  Assisted  Dressing  Assisted  Toileting  Assisted  Feeding  Assisted  Med Admin  Dependent  Med Delivery   Whole in [facility]    Wound Care Documentation and Therapy:  Pressure Ulcer 02/07/16 Buttocks Left (Active)   Number of days: 1663       Pressure Ulcer 02/07/16 Buttocks Right (Active)   Number of days: 1663        Elimination:  Continence:   · Bowel:  Yes  · Bladder: Yes  Urinary Catheter: None   Colostomy/Ileostomy/Ileal Conduit: No       Date of Last BM: 8/27/20    Intake/Output Summary (Last 24 hours) at 8/27/2020 1039  Last data filed at 8/27/2020 0157  Gross per 24 hour   Intake 110 ml   Output 450 ml   Net -340 ml     I/O last 3 completed shifts: In: 110 [P.O.:110]  Out: 450 [Urine:450]    Safety Concerns:     Sundowners [place] and At Risk for Falls    Impairments/Disabilities:      None    Nutrition Therapy:  Current Nutrition Therapy:   - Oral Diet:  General    Routes of Feeding: Oral  Liquids: Thin Liquids  Daily Fluid Restriction: 2L  Last Modified Barium Swallow with Video (Video Swallowing Test): not done    Treatments at the Time of Hospital Discharge:   Respiratory Treatments: n/a  Oxygen Therapy:  is not on home oxygen therapy. Ventilator:    - No ventilator support    Rehab Therapies: Physical Therapy, Occupational Therapy and Nurse  Weight Bearing Status/Restrictions: No weight bearing restirctions  Other Medical Equipment (for information only, NOT a DME order):       Other Treatments: HOME HEALTH CARE: LEVEL [address] [place] [address] agency to establish plan of care for patient over 60 day period   Nursing  Initial home SN evaluation visit to occur within 24-48 hours for:  1)  medication management  2)  VS and clinical assessment  3)  S&S chronic disease exacerbation education + when to contact MD/NP  4)  care coordination  Medication Reconciliation during 1st SN visit  PT/OT/Speech   Evaluations in home within 24-48 hours of discharge to include DME and home safety   Frontload therapy 5 days, then 3x a week   OT to evaluate if patient has [address] [facility] [address] needs for personal care    evaluation within 24-48 hours to evaluate resources & insurance for potential AL, [place], LTC, and Medicaid options   Palliative Care referral within 5 days of hospital discharge   PCP Visit scheduled within 3 - 7 days of hospital discharge [facility] [address] (If patient is agreeable and meets guidelines)      Patient's personal belongings (please select all that are sent with patient):  None    RN SIGNATURE:  Electronically signed by Shelly Michel RN on 8/27/20 at 3:02 PM EDT    CASE MANAGEMENT/SOCIAL WORK SECTION    Inpatient Status Date: 8/21/20    Readmission Risk Assessment Score:  Readmission Risk              Risk of Unplanned Readmission:        24           Discharging to Facility/ Agency   Name:  [facility]    Address: [address] [place] [address]., [address] [place] [address]., [place] [address]  Phone: [phone]  Fax: [phone]      Dialysis Facility (if applicable) n/a  · Name:  · Address:  · Dialysis Schedule:  · Phone:  · Fax:    / signature: Electronically signed by Cindy Shin RN on 8/27/20 at 11:40 AM EDT    PHYSICIAN SECTION    Prognosis: Fair    Condition at Discharge: Stable    Rehab Potential (if transferring to Rehab): N/A    Recommended Labs or Other Treatments After Discharge: Home health care with PT/OT    Physician Certification: I certify the above information and transfer of Jeff Leahy  is necessary for the continuing treatment of the diagnosis listed and that she requires [address] for greater 30 days.      Update Admission H&P: No change in H&P    PHYSICIAN SIGNATURE:  Electronically signed by TIMBO Pickett CNP on 8/27/20 at 10:39 AM EDT

## 2020-08-27 NOTE — PROGRESS NOTES
Occupational Therapy   Occupational Therapy Initial Assessment/Treatment  Date: 2020   Patient Name: Amalia Saint  MRN: [id number]     : 1927    Date of Service: 2020    Discharge Recommendations:  Subacute/Skilled Nursing Facility       Assessment   Performance deficits / Impairments: Decreased functional mobility ; Decreased ADL status; Decreased balance;Decreased strength;Decreased safe awareness;Decreased cognition  After evaluation, pt found to be presenting with the above mentioned occupational performance deficits which are affecting participation in daily living skills. Pt would benefit from continued skilled occupational therapy to address ADLs, functional mobility, and safety while in acute care. If pt is unable to be seen after this session, please let this note serve as discharge summary. Please see case management note for discharge disposition. Thank you. Prognosis: Good  Decision Making: Medium Complexity  OT Education: OT Role;Plan of Care;ADL Adaptive Strategies;Transfer Training  REQUIRES OT FOLLOW UP: Yes  Activity Tolerance  Activity Tolerance: Patient Tolerated treatment well  Activity Tolerance: Vitals: Bp 103/58. No dizziness or lightheadedness with mobility. Safety Devices  Safety Devices in place: Yes  Type of devices: Nurse notified;Call light within reach;Gait belt;Left in chair;Chair alarm in place           Patient Diagnosis(es): The primary encounter diagnosis was Shortness of breath. Diagnoses of Hypoxia and Lung nodule were also pertinent to this visit.      has a past medical history of Allergic rhinitis, Arthritis, Atrial fibrillation (HCC), CAD (coronary artery disease), CHF (congestive heart failure) ([address] COPD (chronic obstructive pulmonary disease) ([address] Diverticulosis, GERD (gastroesophageal reflux disease), Headache(784.0), Hiatal hernia, HTN, Hyperlipidemia, Lung cancer ([address] Macular degeneration, Osteoporosis, Pneumonia, and Pulmonary Hypertension. has a past surgical history that includes Appendectomy; Cholecystectomy; Lung removal, partial (1997); Pacemaker insertion; Cataract removal; Upper gastrointestinal endoscopy (10/2011); skin biopsy; Colonoscopy (10/07); Colonoscopy (4/16/2014); and Upper gastrointestinal endoscopy (7/14/2014). Restrictions  Restrictions/Precautions  Restrictions/Precautions: General Precautions, Fall Risk, Up as Tolerated  Position Activity Restriction  Other position/activity restrictions: High fall risk per nursing assessment    Subjective   General  Chart Reviewed: Yes  Patient assessed for rehabilitation services?: Yes  Family / Caregiver Present: No  Referring Practitioner: TIMBO Santana CNP   8/26/2020  Diagnosis: CHF  Subjective  Subjective: Pt pleasantly confused agreeable to OT evaluaiton, states would like to finish her breakfast.  Patient Currently in Pain: Denies  Social/Functional History  Social/Functional History  Lives With: Spouse  Type of Home: House  Home Layout: One level  Home Access: Stairs to enter with rails  Entrance Stairs - Number of Steps: 5 CAROLANN  Entrance Stairs - Rails: Both  Bathroom Shower/Tub: Walk-in shower  Bathroom Toilet: Standard  Home Equipment: Rolling walker, Cane  ADL Assistance: Independent  Homemaking Responsibilities: No  Ambulation Assistance: Independent(without device)  Transfer Assistance: Independent  Active : No  Occupation: Retired       Objective   Vision: Impaired  Vision Exceptions: Wears glasses for reading  Hearing: Exceptions to [facility]  Hearing Exceptions: Hard of hearing/hearing concerns    Orientation  Overall Orientation Status: Impaired  Orientation Level: Oriented to person;Disoriented to place; Disoriented to time;Disoriented to situation(she new she was in the hospital, just not which one)     Balance  Sitting Balance: Stand by assistance  Standing Balance: Minimal assistance(with RW)  Functional Mobility  Functional - Mobility Minutes 33         Timed Code Treatment Minutes: [facility] Minutes       Francis Crowley [place]

## 2020-08-27 NOTE — DISCHARGE SUMMARY
swabbed for COVID just to be sure which was negative. Hospital medicine was called for admission. Suspected acute on chronic diastolic heart failure (resolved)  - No pulmonary edema noted on either CXR or CT, but the pt has convincing symptoms, bibasilar rales and BLE pitting edema.   - Last ECHO 2/2013 with EF 55%, updated ECHO shows normal LVEF with G2DD.  - Dyspnea and BLE edema mproved with IV diuresis which was then subsequently switched to PO. Continue metoprolol. Unable to due ACEi/ARB.     Chest pain in pt with history of CAD (resolved)  - EKG non-ischemic, troponin trend negative, currently chest pain free. - Stress test abnormal. Cardiology consulted -- no further workup at this time given pt's elderly age.      Dyspnea, acute hypoxic respiratory failure (resolved)  - 2/2 CHF, COPD, frailty, h/o lobectomy.  - Supplemental O2 has been weaned off      Acute metabolic encephalopathy (resolved)  - Secondary to hypoxia/CHF. - Treatment as detailed above. Hospital acquired delirium (resolved)  - Pt was alert/oriented and making appropriate conversation on 8/24. Pt became agitated the evening of 8/24 requiring seroquel and wrist restraints. Pt treated with seroquel and haldol prn while inpt. CT head negative. UA unremarkable. TSH, B12 and folate WNL. Mentation seems back to her baseline.      Paroxysmal atrial fibrillation  - Rate controlled with metoprolol.  - Continue coumadin.      Hypertension  - Variable control.  - Continue metoprolol.     Chronic obstructive pulmonary disease   - No evidence of exacerbation.  - Continue inhaled bronchodilator therapy. Physical Exam Performed:     BP (!) 182/76   Pulse 63   Temp 97.5 °F (36.4 °C) (Oral)   Resp 16   Ht 5' 8\" (1.727 m)   Wt 141 lb 1.5 oz (64 kg)   SpO2 97%   BMI 21.45 kg/m²       General appearance:  Elderly female in no apparent distress, appears stated age and cooperative. HEENT:  Normal cephalic, atraumatic without obvious deformity. Pupils equal, round, and reactive to light. Extra ocular muscles intact. Conjunctivae/corneas clear. Neck: Supple, with full range of motion. No jugular venous distention. Trachea midline. Respiratory:  Normal respiratory effort. Clear to auscultation, bilaterally without Rales/Wheezes/Rhonchi. Cardiovascular:  Regular rate and rhythm with normal S1/S2 without murmurs, rubs or gallops. Abdomen: Soft, non-tender, non-distended with normal bowel sounds. Musculoskeletal:  No clubbing, cyanosis or edema bilaterally. Full range of motion without deformity. Skin: Skin color, texture, turgor normal.  No rashes or lesions. Neurologic:  Neurovascularly intact without any focal sensory/motor deficits. Cranial nerves: II-XII intact, grossly non-focal.  Psychiatric:  Alert and oriented  Capillary Refill: Brisk,< 3 seconds   Peripheral Pulses: +2 palpable, equal bilaterally       Labs: For convenience and continuity at follow-up the following most recent labs are provided:      CBC:    Lab Results   Component Value Date    WBC 7.4 08/25/2020    HGB 14.4 08/25/2020    HCT 42.6 08/25/2020     08/25/2020       Renal:    Lab Results   Component Value Date     08/26/2020    K 3.8 08/26/2020    CL 92 08/26/2020    CO2 28 08/26/2020    BUN 32 08/26/2020    CREATININE 1.0 08/26/2020    CALCIUM 10.7 08/26/2020         Significant Diagnostic Studies    Radiology:   CT HEAD WO CONTRAST   Final Result   No acute intracranial abnormality. NM Cardiac Stress Test Nuclear Imaging   Final Result      CT CHEST WO CONTRAST   Final Result   1. Small left pleural effusion. 2. No focal lung consolidation. 3. New indeterminate 6 mm right lower lobe pulmonary nodule. Follow-up chest   CT can be considered in 6-12 months. Follow-up should take into account the   patient's age and patient's comorbidities. 4. Minimal linear areas of atelectasis and scarring at the lung bases.       RECOMMENDATIONS:   6.0 mm solid pulmonary nodule. Recommend a non-contrast Chest CT at 6-12   months, then consider an additional non-contrast Chest CT at 18-24 months. These guidelines do not apply to immunocompromised patients and patients with   cancer. Follow up in patients with significant comorbidities as clinically   warranted. For lung cancer screening, adhere to Lung-RADS guidelines. Reference: Radiology. 2017; 284(1):228-43. XR CHEST PORTABLE   Final Result   Cardiomegaly with chronic interstitial changes. No definite infiltrate or   edema                Consults:     IP CONSULT TO HOSPITALIST  IP CONSULT TO PHARMACY  IP CONSULT TO CARDIOLOGY  IP CONSULT TO HOME CARE NEEDS    Disposition:  Home with [facility] PT/OT (pt's  declined SNF as per therapy recs)    Condition at Discharge: Stable    Discharge Instructions/Follow-up:  Follow-up with PCP     Code Status:  Full Code     Activity: activity as tolerated    Diet: cardiac diet      Discharge Medications:     Current Discharge Medication List           Details   metoprolol succinate (TOPROL XL) 25 MG extended release tablet Take 1 tablet by mouth daily  Qty: 30 tablet, Refills: 3              Details   hydrocortisone (ANUSOL-HC) 2.5 % rectal cream Place rectally 2 times daily. Qty: 1 Tube, Refills: 0      gatifloxacin (ZYMAR) 0.5 % SOLN Place 1 drop into the right eye 4 times daily as needed. Qty: 1 Bottle, Refills: 3      dicyclomine (BENTYL) 20 MG tablet Take 1 tablet by mouth every 6 hours. Qty: 120 tablet, Refills: 3      tiotropium (SPIRIVA HANDIHALER) 18 MCG inhalation capsule Inhale 1 capsule into the lungs daily. Qty: 90 capsule, Refills: 3    Associated Diagnoses: COPD (chronic obstructive pulmonary disease) (HCC)      ranitidine (ZANTAC) 150 MG capsule Take 1 capsule by mouth 2 times daily. For heartburn/reflux  Qty: 180 capsule, Refills: 3      linaclotide (LINZESS) 145 MCG capsule Take 1 capsule by mouth every morning (before breakfast).   Qty: 30 capsule, Refills: 6      Probiotic Product (PROBIOTIC ACIDOPHILUS) CAPS Take  by mouth 2 times daily. warfarin (COUMADIN) 5 MG tablet Take 1 tablet by mouth Daily. Qty: 90 tablet, Refills: 3    Associated Diagnoses: HTN (hypertension)      benazepril (LOTENSIN) 20 MG tablet Take 0.5 tablets by mouth 2 times daily for 90 days. Qty: 90 tablet, Refills: 3    Associated Diagnoses: HTN (hypertension)      acetaminophen (TYLENOL) 500 MG tablet Take 500 mg by mouth every 6 hours as needed. furosemide (LASIX) 40 MG tablet Take 1 tablet by mouth daily. Qty: 60 tablet, Refills: 3      potassium chloride SA (K-DUR;KLOR-CON M) 20 MEQ tablet Take 1 tablet by mouth daily. Qty: 30 tablet, Refills: 3      albuterol (PROVENTIL) (2.5 MG/3ML) 0.083% nebulizer solution Take 3 mLs by nebulization every 4 hours as needed. Qty: 120 each, Refills: 12      Multiple Vitamin (MULTIVITAMINS PO) Take 1 tablet by mouth daily. Calcium-Vitamin D (CALTRATE 600 PLUS-VIT D PO) Take 1 tablet by mouth daily. Time Spent on discharge is more than 45 minutes in the examination, evaluation, counseling and review of medications and discharge plan. Signed:    TIMBO Ferrera - CNP   8/27/2020      Thank you Sohan Meza MD for the opportunity to be involved in this patient's care. If you have any questions or concerns please feel free to contact me at [phone].

## 2020-08-27 NOTE — PLAN OF CARE
Problem: Nutrition  Goal: Optimal nutrition therapy  Outcome: Ongoing  Note: Nutrition Problem #1: Inadequate oral intake  Intervention: Food and/or Nutrient Delivery: Continue Current Diet, Start Oral Nutrition Supplement  Nutritional Goals: Pt will consume greater than 50% of meals and ONS this admission

## 2020-08-27 NOTE — PROGRESS NOTES
Pharmacy to Dose Warfarin     Dx: AFib  Goal INR range 2-3  Home Warfarin dose: 5mg daily     Date                 INR                  Warfarin  8/21                  2.81                   5 mg  8/22                  3.60                   Hold    8/23                  3.74                   Hold      8/24                  2.58                   5 mg        8/25                  2.45                   4 mg                      8/26                  3.03                   Hold   8/27                  3.89                   Hold   Recommend to hold warfarin X1 tonight . Daily INR ordered. Rx will continue to manage therapy per consult order. Jessica Mcbride. 8/27/20 11:08 AM EDT      Home dose was 2.5mg daily except 5mg on Sun per Dr. Beth Thomason office personnel  - pt.has follow up appointment on 8/31 at 2:40pm, office staff was re quested to check INR  during follow up visit  - recommended to hold dose today and tomorrow then resume at 2.5mg daily.   Jessica Mcbride. 8/27/20 11:13 AM EDT

## 2020-08-27 NOTE — PROGRESS NOTES
Paged NP at 1945: Pt has no documented output for the day since 0530 this morning. Fluids were started at 1425 and pt is dry right now. Please advise    Response 500ml blous    Paged NP at 0053: Bladder scanned pt again, now has 457 ml in her bladder and has only urinated once around 2130.

## 2020-08-27 NOTE — CARE COORDINATION
[facility]    DC order noted, all docs needed have been faxed to [facility] [place] [facility] for home care services.     Home care to see patient within 24-48 hrs    Jay Camacho RN, BSN CTN  [facility] [place] [facility] [phone]

## 2020-08-27 NOTE — PROGRESS NOTES
Comprehensive Nutrition Assessment    Type and Reason for Visit:  Initial(LOS)    Nutrition Recommendations/Plan:   1. Continue general diet   2. Add Ensure TID   3. Encourage PO intakes   4. Monitor nutrition adequacy, pertinent labs, bowel habits, wt changes, and clinical progress    Nutrition Assessment:  Pt admitted with CHF, PMH of CAD, COPD, GERD, and lung cancer. Noted pt refusing care, agitated and confused past couple of days. Pt able and willing to answer questions today. Pt nutritionally compromised AEB poor appetite and PO intakes. PO intakes have decreased x3 days per EMR (1-25%). Pt favorable to ONS, will add. Encouraged PO intakes. Will continue to monitor. Malnutrition Assessment:  Malnutrition Status: At risk for malnutrition (Comment)      Estimated Daily Nutrient Needs:  Energy (kcal):  8625-6219 kcal; Weight Used for Energy Requirements:  Current(64 kg)     Protein (g):  64-77 g; Weight Used for Protein Requirements:  Current(1.01.2 g/kg)        Fluid (ml/day):  1 ml/kcal; Weight Used for Fluid Requirements:  Current      Nutrition Related Findings:  [place], Last BM on 8/21      Wounds:  None       Current Nutrition Therapies:    DIET GENERAL;     Anthropometric Measures:  · Height: 5' 8\" (172.7 cm)  · Current Body Weight: 141 lb (64 kg)   · Admission Body Weight: 140 lb (63.5 kg)(bed scale)    · Ideal Body Weight: 140 lbs; % Ideal Body Weight 100.7 %   · BMI: 21.4  · BMI Categories: Underweight (BMI less than 22) age over 72       Nutrition Diagnosis:   · Inadequate oral intake related to inadequate protein-energy intake, cognitive or neurological impairment as evidenced by intake 0-25%      Nutrition Interventions:   Food and/or Nutrient Delivery:  Continue Current Diet, Start Oral Nutrition Supplement  Nutrition Education/Counseling:  Education not indicated   Coordination of Nutrition Care:  Continued Inpatient Monitoring    Goals:  Pt will consume greater than 50% of meals and ONS this admission       Nutrition Monitoring and Evaluation:   Food/Nutrient Intake Outcomes:  Food and Nutrient Intake, Supplement Intake  Physical Signs/Symptoms Outcomes:  Biochemical Data, Weight     Discharge Planning:    Continue current diet, Continue Oral Nutrition Supplement     Electronically signed by Sheela Vargas MS, RD, LD on 8/27/20 at 10:51 AM EDT    Contact: Office: [phone]

## 2020-08-27 NOTE — PROGRESS NOTES
breath. Diagnoses of Hypoxia and Lung nodule were also pertinent to this visit. has a past medical history of Allergic rhinitis, Arthritis, Atrial fibrillation (HCC), CAD (coronary artery disease), CHF (congestive heart failure) ([facility] [address] COPD (chronic obstructive pulmonary disease) ([facility] [address] Diverticulosis, GERD (gastroesophageal reflux disease), Headache(784.0), Hiatal hernia, HTN, Hyperlipidemia, Lung cancer ([facility] [address] Macular degeneration, Osteoporosis, Pneumonia, and Pulmonary Hypertension. has a past surgical history that includes Appendectomy; Cholecystectomy; Lung removal, partial (1997); Pacemaker insertion; Cataract removal; Upper gastrointestinal endoscopy (10/2011); skin biopsy; Colonoscopy (10/07); Colonoscopy (4/16/2014); and Upper gastrointestinal endoscopy (7/14/2014). Restrictions  Restrictions/Precautions  Restrictions/Precautions: General Precautions, Fall Risk, Up as Tolerated  Position Activity Restriction  Other position/activity restrictions: High fall risk per nursing assessment  Vision/Hearing  Vision: Impaired (pt wears reading glasses)  Hearing: Exceptions ([place])  Subjective  Chart Reviewed: Yes  Patient assessed for rehabilitation services?: Yes  Family / Caregiver Present: Yes()  Referring Practitioner: TIMBO Khan CNP  Referral Date : 08/26/20  Diagnosis: SOB, chest pain, acute-on-chronic CHF  Follows Commands: Within Functional Limits  General Comment  Comments: Pt resting in bed upon entry of therapy staff  Subjective: Pt agreeable to work with PT/OT this morning. States she's feeling \"not so great\" while ambulating due to weakness. Patient Currently in Pain: Denies  Intervention List: Patient able to continue with treatment    Orientation  Orientation  Overall Orientation Status: Impaired  Orientation Level: Oriented to person;Disoriented to place; Disoriented to time;Disoriented to situation(pt oriented to general place (\"hospital\") but thought she was at [facility])     Social/Functional History  Social/Functional History  Lives With: Spouse  Type of Home: House  Home Layout: One level  Home Access: Stairs to enter with rails  Entrance Stairs - Number of Steps: 5 CAROLANN  Entrance Stairs - Rails: Both  Bathroom Shower/Tub: Walk-in shower  Bathroom Toilet: Standard  Home Equipment: Rolling walker, Cane  ADL Assistance: Independent  Homemaking Responsibilities: No  Ambulation Assistance: Independent(without device)  Transfer Assistance: Independent  Active : No  Occupation: Retired    Objective  Observation/Palpation  Posture: Fair    RLE AROM: WFL  LLE AROM : WFL  Strength RLE: Grossly 3+/5 to 4-/5 throughout  Strength LLE: Grossly 3+/5 to 4-/5 throughout     Bed mobility  Supine to Sit: Minimal assistance(moving toward R side, HOB elevated ~30 degrees)  Scooting: Minimal assistance(to scoot forward to EOB)     Transfers  Sit to Stand: Minimal Assistance(mod cues needed for safe technique, pt unsteady upon initially standing)  Stand to sit: Contact guard assistance  Bed to Chair: Minimal assistance(using RW, mod cues needed for safe technique)     Ambulation  Surface: level tile  Device: Rolling Walker  Assistance: Minimal assistance  Quality of Gait: Pt amb with slow sofia with narrow SHEREE and short, shuffling steps at times. LOB x 3 while amb (usually forward and toward L side), corrected with Miryam from PT. Frequent verbal/visual cues needed for direction. Gait Deviations: Slow Sofia;Decreased step length;Decreased step height;Deviated path;Staggers  Distance: x 35 feet  Comments: Amb distance limited by c/o fatigue and dizziness.      Balance  Posture: Fair  Sitting - Static: Good;-  Sitting - Dynamic: Fair  Standing - Static: Fair;-  Standing - Dynamic: Fair;-(using RW)    Plan   Times per week: 3-5x/week in acute care  Times per day: Daily  Specific instructions for Next Treatment: Progress ther ex and mobility as tolerated  Current Treatment Recommendations: Strengthening, Balance Training, Endurance Training, Functional Mobility Training, Transfer Training, Gait Training, Safety Education & Training, Patient/Caregiver Education & Training, Equipment Evaluation, Education, & procurement  Safety Devices: All fall risk precautions in place, Left in chair, Call light within reach, Chair alarm in place, Gait belt, Patient at risk for falls, Telesitter in use, Nurse notified    AM-PAC Score  AM-PAC Inpatient Mobility Raw Score : 16 (08/27/20 1208)  AM-PAC Inpatient T-Scale Score : 40.78 (08/27/20 1208)  Mobility Inpatient CMS 0-100% Score: 54.16 (08/27/20 1208)  Mobility Inpatient CMS G-Code Modifier : CK (08/27/20 1208)    Goals  Short term goals  Time Frame for Short term goals: 1 week, 9/03/20 (unless otherwise specified)  Short term goal 1: Pt will transfer supine <-> sit with supervision  Short term goal 2: Pt will complete all sit<>stand transfers with supervision  Short term goal 3: Pt will ambulate x 100 feet using RW or least AD with supervision  Short term goal 4: By 8/30/20: Pt will tolerate 12-15 reps BLE exercise for strengthening, balance, and endurance  Patient Goals   Patient goals : \"To get stronger\"       Therapy Time   Individual Concurrent Group Co-treatment   Time In 5709         Time Out 0932         Minutes 33         Timed Code Treatment Minutes: [address], [place], [place] #[id number]    If pt is unable to be seen after this session, please let this note serve as discharge summary. Please see case management note for discharge disposition. Thank you.

## 2020-08-27 NOTE — PROGRESS NOTES
Patient discharged home with home health.  to care for her at home. IV removed. Discharge instructions given, all questions answered as needed. Patient taken downstairs to front lobby by wheel chair.

## 2020-08-28 NOTE — CARE COORDINATION
Patient contacted regarding Johanna Seaman. COVID-19 not detected on 2020. Patient informed of results, if available? Yes    Patient has following risk factors of: heart failure, COPD and lung cancer. Care Transition Nurse contacted the spouse by telephone to perform post discharge assessment. Verified name and  with spouse as identifiers. Provided introduction to self, and explanation of the CTN/ACM role, and reason for call due to risk factors for infection and/or exposure to COVID-19. Symptoms reviewed with spouse who verbalized the following symptoms: no new symptoms and no worsening symptoms. Due to no new or worsening symptoms encounter was not routed to provider for escalation. Discussed follow-up appointments. If no appointment was previously scheduled, appointment scheduling offered: Yes and scheduled    [facility] follow up appointment(s): No future appointments. Non-[facility] follow up appointment(s): Monday, 2020 with PCP at 2:40PM - reviewed with spouse who voices understanding. Advance Care Planning:   Does patient have an Advance Directive:  decision maker updated. CTN reviewed discharge instructions, medical action plan and red flags such as increased shortness of breath, increasing fever and signs of decompensation with spouse who verbalized understanding. Discussed exposure protocols and quarantine with CDC Guidelines What to do if you are sick with coronavirus disease .  spouse was given an opportunity for questions and concerns. The spouse agrees to contact the [facility] exposure line [phone], [facility] department [address]: ([phone]) and PCP office for questions related to their healthcare. Reviewed and educated spouse on any new and changed medications related to discharge diagnosis     Patient/family/caregiver given information for Lois Rodríguez and agrees to enroll no    Spouse reports that Ninfa Perez is resting.  She is tolerating PO. Denies edema, SOB, cough, fever. Aware COVID was negative. Unsure if they will allow [facility] to visit because Birgida Darling doesn't like people in the home. Declines referral to [facility] for meals through [facility]. Reviewed [facility] appt on Monday he plans to take her. Reviewed CHF education:     CHF education:  -weigh daily in the morning at the same time and in the same clothing  -report weight gain of >3#/day or >5#/week  -report increased SOB, edema, abd fullness, difficulty lying flat  -report palpitations, cough, difficulty urinating    They have a scale but she does not like to weigh daily. He is aware of the reason for daily weights and when to call - he will encourage. Denies needs at this time. States he has support system through friends who live nearby and have already called to offer help if needed. Agreeable to follow up next week. CTN contact info provided. Plan for follow-up call in 3-5 days based on severity of symptoms and risk factors.     Jonathon Mena RN  Care Transition Nurse  [phone] mobile

## 2020-09-02 NOTE — CARE COORDINATION
You Patient resolved from the Care Transitions episode on 9/2/2020  Discussed COVID-19 related testing which was available at this time. Test results were negative. Patient informed of results, if available? Yes    Patient/family has been provided the following resources and education related to COVID-19:                         Signs, symptoms and red flags related to COVID-19            CDC exposure and quarantine guidelines            [facility] exposure contact - [phone]            Contact for their local Department of Health                 Patient currently reports that the following symptoms have improved:  no new or worsening symptoms    No further outreach scheduled with this CTN/ACM. Episode of Care resolved. Patient has this CTN/ACM contact information if future needs arise. Spoke with patient who reported she is having issues with a yeast infection. Patient stated she discussed yeast infection with her doctor and was prescribed some medicine but states it is not working. CTN encouraged patient to contact her doctor to discuss that medication is not working and patient stated her doctor was out of town and she doesn't like the doctor currently filling in. CTN encouraged patient to try an urgent care and patient declined stating she would take care of it. Patient denied any SOB, CP, fever, or cough. Patient continues to work with [facility] and stated everything is going well. Patient denied any other issues or concerns at this time. CTN advised patient of use of urgent care or physicians 24 hr access line if assistance is needed after hours. Also advised that they can always contact their home care provider to request a nurse visit even if it isn't their regularly scheduled day for their nurse to visit.          Wolf BEANN, RN, [facility]  Care Transition Nurse   [phone]

## 2020-11-22 PROBLEM — R41.0 CONFUSION: Status: ACTIVE | Noted: 2020-01-01

## 2020-11-22 NOTE — ED PROVIDER NOTES
[facility] Emergency Department    CHIEF COMPLAINT  Altered Mental Status (pt lives at home and taken care of by neighors, they states she is more altered than normal, started after   last month, hx of dementia)      HISTORY OF PRESENT ILLNESS  Dinora Smith is a 80 y.o. female who presents to the ED complaining of altered mental status per patient's neighbors. Patient was sent into the emergency department by EMS for evaluation. Patient reportedly has a history of dementia, but is \"more altered than normal\" per patient's neighbors have been checking on her. Patient's  passed away approximately 1 month ago. Otherwise no history obtained at the time of history and physical examination. Patient able to state name and age otherwise poor historian. Unsure if this is patient's baseline. No other complaints, modifying factors or associated symptoms. Nursing notes reviewed.    Past Medical History:   Diagnosis Date    Allergic rhinitis     Arthritis     states she has Osteo arthritis    Atrial fibrillation (HCC)     Dr. Genny Wilcox CAD (coronary artery disease)     states AFib, 2 valves seeping, pacemaker    CHF (congestive heart failure) ([facility] [address]     states she was dx with it once    COPD (chronic obstructive pulmonary disease) ([facility] [address]     severe , moderately Severe  PFT    Diverticulosis     GERD (gastroesophageal reflux disease)     states she goes to [employer])     Hiatal hernia     gastritis    HTN     states 2 years ago and on medication for it    Hyperlipidemia     states she has a history not on medication now    Lung cancer [facility])     right middle lobectomy    Macular degeneration     right eye    Osteoporosis     Dr Sharon Rubin Pneumonia     Pulmonary Hypertension      Past Surgical History:   Procedure Laterality Date    APPENDECTOMY      CATARACT REMOVAL      right    CHOLECYSTECTOMY      COLONOSCOPY  10/07    COLONOSCOPY  4/16/2014    polyps, diverticulosis    LUNG REMOVAL, PARTIAL  1997    right middle    PACEMAKER INSERTION      SKIN BIOPSY      states negative one off of the face    UPPER GASTROINTESTINAL ENDOSCOPY  10/2011    UPPER GASTROINTESTINAL ENDOSCOPY  7/14/2014    Dilated esophagus     Family History   Problem Relation Age of Onset    Heart Disease Mother     Cancer Sister         lung  and breast     Social History     Socioeconomic History    Marital status:      Spouse name: Not on file    Number of children: Not on file    Years of education: Not on file    Highest education level: Not on file   Occupational History    Not on file   Social Needs    Financial resource strain: Not on file    Food insecurity     Worry: Not on file     Inability: Not on file    Transportation needs     Medical: Not on file     Non-medical: Not on file   Tobacco Use    Smoking status: Never Smoker    Smokeless tobacco: Never Used   Substance and Sexual Activity    Alcohol use: No    Drug use: Never    Sexual activity: Yes     Partners: Male   Lifestyle    Physical activity     Days per week: Not on file     Minutes per session: Not on file    Stress: Not on file   Relationships    Social connections     Talks on phone: Not on file     Gets together: Not on file     Attends Jainism service: Not on file     Active member of club or organization: Not on file     Attends meetings of clubs or organizations: Not on file     Relationship status: Not on file    Intimate partner violence     Fear of current or ex partner: Not on file     Emotionally abused: Not on file     Physically abused: Not on file     Forced sexual activity: Not on file   Other Topics Concern    Not on file   Social History Narrative    Not on file     Current Facility-Administered Medications   Medication Dose Route Frequency Provider Last Rate Last Dose    0.9 % sodium chloride bolus  1,000 mL Intravenous Once Maxine A TIMBO Wesbter CNP         Current Outpatient Medications   Medication Sig Dispense Refill    metoprolol succinate (TOPROL XL) 25 MG extended release tablet Take 1 tablet by mouth daily 30 tablet 3    warfarin (COUMADIN) 2.5 MG tablet Take 1 tablet by mouth daily 30 tablet 3    hydrocortisone (ANUSOL-HC) 2.5 % rectal cream Place rectally 2 times daily. 1 Tube 0    gatifloxacin (ZYMAR) 0.5 % SOLN Place 1 drop into the right eye 4 times daily as needed. 1 Bottle 3    dicyclomine (BENTYL) 20 MG tablet Take 1 tablet by mouth every 6 hours. 120 tablet 3    tiotropium (SPIRIVA HANDIHALER) 18 MCG inhalation capsule Inhale 1 capsule into the lungs daily. 90 capsule 3    ranitidine (ZANTAC) 150 MG capsule Take 1 capsule by mouth 2 times daily. For heartburn/reflux 180 capsule 3    linaclotide (LINZESS) 145 MCG capsule Take 1 capsule by mouth every morning (before breakfast). 30 capsule 6    Probiotic Product (PROBIOTIC ACIDOPHILUS) CAPS Take  by mouth 2 times daily.  benazepril (LOTENSIN) 20 MG tablet Take 0.5 tablets by mouth 2 times daily for 90 days. 90 tablet 3    acetaminophen (TYLENOL) 500 MG tablet Take 500 mg by mouth every 6 hours as needed.  furosemide (LASIX) 40 MG tablet Take 1 tablet by mouth daily. 60 tablet 3    potassium chloride SA (K-DUR;KLOR-CON M) 20 MEQ tablet Take 1 tablet by mouth daily. 30 tablet 3    albuterol (PROVENTIL) (2.5 MG/3ML) 0.083% nebulizer solution Take 3 mLs by nebulization every 4 hours as needed. 120 each 12    Multiple Vitamin (MULTIVITAMINS PO) Take 1 tablet by mouth daily.  Calcium-Vitamin D (CALTRATE 600 PLUS-VIT D PO) Take 1 tablet by mouth daily.        Allergies   Allergen Reactions    Latex Hives    Advair [Fluticasone-Salmeterol]     Alendronate Sodium Other (See Comments)     GERD      Avapro [Irbesartan]     Avelox [Moxifloxacin Hcl In Nacl] Hives    Carafate [Sucralfate]      Dizziness, shortness of breath    Ciprofloxacin Hives and Swelling    Clindamycin/Lincomycin     Crestor [Rosuvastatin]     Erythromycin     Flagyl [Metronidazole]     Gemfibrozil Nausea Only    Iodine     Lipitor [Atorvastatin]     Penicillins     Pravachol [Pravastatin Sodium] Hives    Prednisone     Sulfa Antibiotics      rash    Benicar [Olmesartan Medoxomil] Rash     Face swelling    Lisinopril Other (See Comments)     Cough       REVIEW OF SYSTEMS  10 systems reviewed, pertinent positives per HPI otherwise noted to be negative    PHYSICAL EXAM  BP (!) 160/75   Pulse 89   Temp 98 °F (36.7 °C) (Oral)   Resp 16   Ht 5' 8\" (1.727 m)   Wt 133 lb (60.3 kg)   SpO2 96%   BMI 20.22 kg/m²   GENERAL APPEARANCE: Awake and alert. Cooperative. No acute distress. Vital signs are able. Well appearing and non toxic. HEAD: Normocephalic. Atraumatic. EYES: PERRL. EOM's grossly intact. ENT: Mucous membranes are moist.   NECK: Supple. Normal ROM. HEART: RRR. Distal pulses are equal and intact. Cap refill less than 2 seconds. LUNGS: Respirations unlabored. CTAB. Good air exchange. Speaking comfortably in full sentences. No wheezing, rhonchi, rales, stridor. ABDOMEN: Soft. Non-distended. Non-tender. No guarding or rebound. No masses. No organomegaly. No rigidity. EXTREMITIES: No peripheral edema. Moves all extremities equally. All extremities neurovascularly intact. SKIN: Warm and dry. No acute rashes. NEUROLOGICAL: Alert. Oriented to self. No gross facial drooping. Strength 5/5, sensation intact. PSYCHIATRIC: Normal mood and affect. SCREENINGS       RADIOLOGY  Xr Chest (2 Vw)    Result Date: 11/22/2020  EXAMINATION: TWO XRAY VIEWS OF THE CHEST 11/22/2020 5:37 pm COMPARISON: Chest x-ray August 21, 2020; CT chest August 21, 2020 HISTORY: ORDERING SYSTEM PROVIDED HISTORY: confusion TECHNOLOGIST PROVIDED HISTORY: Reason for exam:->confusion FINDINGS: Cardiac silhouette is enlarged but stable. Left-sided cardiac pacer device in place. Atherosclerotic changes of the aorta. Chronic interstitial changes throughout the lungs appear grossly stable. No pleural effusion or pneumothorax. No well-defined consolidative change evident. Osseous structures appear intact. Surgical clips project over the right upper quadrant. 1. No acute cardiopulmonary process identified. Ct Head Wo Contrast    Result Date: 11/22/2020  EXAMINATION: CT OF THE HEAD WITHOUT CONTRAST  11/22/2020 5:53 pm TECHNIQUE: CT of the head was performed without the administration of intravenous contrast. Dose modulation, iterative reconstruction, and/or weight based adjustment of the mA/kV was utilized to reduce the radiation dose to as low as reasonably achievable. COMPARISON: 08/25/2020 HISTORY: ORDERING SYSTEM PROVIDED HISTORY: ams TECHNOLOGIST PROVIDED HISTORY: Reason for exam:->ams Has a \"code stroke\" or \"stroke alert\" been called? ->No Reason for Exam: AMS - more than normal today      hx-dementia Acuity: Acute Type of Exam: Initial FINDINGS: BRAIN/VENTRICLES: There is generalized atrophy and unchanged moderate to severe chronic small vessel ischemic white matter disease. There is no acute intracranial hemorrhage, mass effect or midline shift. No abnormal extra-axial fluid collection. The gray-white differentiation is maintained without evidence of an acute infarct. ORBITS: The visualized portion of the orbits demonstrate no acute abnormality. SINUSES: The visualized paranasal sinuses and mastoid air cells demonstrate no acute abnormality. SOFT TISSUES/SKULL:  No acute abnormality of the visualized skull or soft tissues. No acute intracranial abnormality. Generalized atrophy and moderate to severe chronic small vessel ischemic white matter disease. CONSULTS  IP CONSULT TO HOSPITALIST    ED COURSE/MDM  Patient seen and evaluated. Old records reviewed. Diagnostic testing reviewed and results discussed.       I have seen this patient in collaboration with supervising physician Dr. Jaclyn Mehta. We thoroughly discussed the history, physical exam, diagnostic testing and emergency department course. Emmanuel Sargent presented to the ED today with above noted complaints. Urinalysis negative for infection. CBC and CMP unremarkable no anemia or acute kidney injury. No electrolyte abnormality. Troponin less than 0.01. EKG interpreted by my attending physician. Chest x-ray shows no acute cardiopulmonary process. Mild orthostatic hypotension upon standing. Patient given a sodium chloride bolus. CT of the head shows no acute intracranial abnormality. I spoke with Julia Salcedo [phone] who is patient's neighbor and power of  and reports that he is very concerned about the patient he checks on her in the morning and then another neighbor checks on her in the evening. He reports that he fears she has not safe to go home because she has mostly by herself and he fears that she will fall or injure herself. He also voices concern that she has lost weight and is not eating. He does not feel that she can go home by herself at this time and would like patient to be admitted for possible placement. Therefore based on the above conversation patient was admitted for further treatment and evaluation. While in ED patient received   Medications   0.9 % sodium chloride bolus (has no administration in time range)         A discussion was had with the patient and/or their surrogate regarding diagnosis, diagnostic testing results, treatment/ plan of care. There was shared decision-making between myself as well as the patient and/or their surrogate and we are all in agreement with hospital admission. There was an opportunity for questions and all questions were answered to the best of my ability and to the satisfaction of the patient and/or patient family.      Results for orders placed or performed during the hospital encounter of 11/22/20   CBC auto differential Result Value Ref Range    WBC 5.9 4.0 - 11.0 K/uL    RBC 4.15 4.00 - 5.20 M/uL    Hemoglobin 13.2 12.0 - 16.0 g/dL    Hematocrit 40.4 36.0 - 48.0 %    MCV 97.3 80.0 - 100.0 fL    MCH 31.8 26.0 - 34.0 pg    MCHC 32.7 31.0 - 36.0 g/dL    RDW 13.9 12.4 - 15.4 %    Platelets 614 814 - 912 K/uL    MPV 9.2 5.0 - 10.5 fL    Neutrophils % 65.5 %    Lymphocytes % 20.6 %    Monocytes % 11.7 %    Eosinophils % 1.6 %    Basophils % 0.6 %    Neutrophils Absolute 3.8 1.7 - 7.7 K/uL    Lymphocytes Absolute 1.2 1.0 - 5.1 K/uL    Monocytes Absolute 0.7 0.0 - 1.3 K/uL    Eosinophils Absolute 0.1 0.0 - 0.6 K/uL    Basophils Absolute 0.0 0.0 - 0.2 K/uL   Comprehensive metabolic panel   Result Value Ref Range    Sodium 136 136 - 145 mmol/L    Potassium 4.4 3.5 - 5.1 mmol/L    Chloride 98 (L) 99 - 110 mmol/L    CO2 31 21 - 32 mmol/L    Anion Gap 7 3 - 16    Glucose 103 (H) 70 - 99 mg/dL    BUN 17 7 - 20 mg/dL    CREATININE 0.7 0.6 - 1.2 mg/dL    GFR Non-African American >60 >60    GFR African American >60 >60    Calcium 10.6 8.3 - 10.6 mg/dL    Total Protein 6.8 6.4 - 8.2 g/dL    Alb 4.1 3.4 - 5.0 g/dL    Albumin/Globulin Ratio 1.5 1.1 - 2.2    Total Bilirubin 0.9 0.0 - 1.0 mg/dL    Alkaline Phosphatase 71 40 - 129 U/L    ALT 14 10 - 40 U/L    AST 23 15 - 37 U/L    Globulin 2.7 g/dL   Urine, reflex to culture    Specimen: Urine, clean catch   Result Value Ref Range    Color, UA Yellow Straw/Yellow    Clarity, UA Clear Clear    Glucose, Ur Negative Negative mg/dL    Bilirubin Urine Negative Negative    Ketones, Urine TRACE (A) Negative mg/dL    Specific Gravity, UA 1.025 1.005 - 1.030    Blood, Urine Negative Negative    pH, UA 6.0 5.0 - 8.0    Protein, UA Negative Negative mg/dL    Urobilinogen, Urine 4.0 (A) <2.0 E.U./dL    Nitrite, Urine Negative Negative    Leukocyte Esterase, Urine Negative Negative    Microscopic Examination Not Indicated     Urine Type NotGiven     Urine Reflex to Culture Not Indicated    Troponin   Result Value Ref Range    Troponin <0.01 <0.01 ng/mL   EKG 12 Lead   Result Value Ref Range    Ventricular Rate 64 BPM    Atrial Rate 60 BPM    QRS Duration 152 ms    Q-T Interval 474 ms    QTc Calculation (Bazett) 489 ms    R Axis 267 degrees    T Axis 64 degrees    Diagnosis       Electronic ventricular pacemakerWhen compared with ECG of 21-AUG-2020 12:25,Previous ECG has undetermined rhythm, needs review       I spoke with Dr. Yasmin Livingston. We thoroughly discussed the history, physical exam, laboratory and imaging studies, as well as, emergency department course. Based upon that discussion, we've decided to admit Shae Linn for further observation and evaluation of Andressa Washburn's confusion, orthostatic hypotension, risk for fall. As I have deemed necessary from their history, physical, and studies, I have considered and evaluated Shae Linn for the following diagnoses: Sepsis, UTI, pneumonia, anemia, intracranial hemorrhage      FINAL IMPRESSION  1. Confusion    2. Orthostatic hypotension    3. Risk for falls        Vitals:  Blood pressure (!) 160/75, pulse 89, temperature 98 °F (36.7 °C), temperature source Oral, resp. rate 16, height 5' 8\" (1.727 m), weight 133 lb (60.3 kg), SpO2 96 %. DISPOSITION  Patient was admitted to the hospital in stable condition. Comment: Please note this report has been produced using speech recognition software and may contain errors related to that system including errors in grammar, punctuation, and spelling, as well as words and phrases that may be inappropriate. If there are any questions or concerns please feel free to contact the dictating provider for clarification.             Rosalia Horton, TIMBO - ANDRES  11/22/20 5238

## 2020-11-22 NOTE — ED NOTES
Bed: 02  Expected date:   Expected time:   Means of arrival:   Comments:  Lobito Cespedes RN  11/22/20 [id number]

## 2020-11-22 NOTE — ED NOTES
Placed straight cath per RN. Drained ~75mL of urine from pt. Sterile technique used. Pt tolerated well. Sample taken.      Nikko Orozco  11/22/20 [id number]

## 2020-11-22 NOTE — ED NOTES

## 2020-11-22 NOTE — ED NOTES
Patient sitting in bed. Provided apple juice per request. Ordered dinner tray for patient. Patient reports that she will not be here for dinner she wants to go home. Patient has rails up x2 and call light in reach. Patient reports that has two neighbors that check on her; patricia and Donnie. (phone number at bedside. )Notified provider of patient's request to go home.      Opal Wilson, [facility]  11/22/20 [id number]

## 2020-11-23 NOTE — ED PROVIDER NOTES
I independently performed a history and physical on Shae Linn. All diagnostic, treatment, and disposition decisions were made by myself in conjunction with the advanced practice provider.     -Shae Linn is a 80 y.o. female presents to ED for mental status, sent by patient's neighbors. Neighbor is power of  and feel that she is not safe at home as she lives by herself. Reports that he feels that she is more confused than normal, with decreased appetite and weight loss.  -lab workup significant for: wnl  -Cxr: No acute cardiopulmonary process identified  -Ua: Evidence of infection  -The Ekg interpreted by me shows  normal pacemaker rhythm with a rate of 64  Axis is   Normal  QTc is  489  Intervals and Durations are unremarkable. ST Segments: no acute change  No significant change from prior EKG dated 8/21/20   -Plan for admission for further workup and treatment of failure to thrive, confusion, and possible placement    For further details of Andressa Sepulveda [facility] emergency department encounter, please see LUPE Williamson Ethel documentation.         Luca Kim MD  11/22/20 1819

## 2020-11-23 NOTE — PROGRESS NOTES
RESPIRATORY THERAPY ASSESSMENT    Name:  Lasha [facility] Record Number:  [id number]  Age: 80 y.o. Gender: female  : 1927  Today's Date:  2020  Room:  30/0530-01    Assessment     Is the patient being admitted for a COPD or Asthma exacerbation? No   (If yes the patient will be seen every 4 hours for the first 24 hours and then reassessed)    Patient Admission Diagnosis      Allergies  Allergies   Allergen Reactions    Latex Hives    Advair [Fluticasone-Salmeterol]     Alendronate Sodium Other (See Comments)     GERD      Avapro [Irbesartan]     Avelox [Moxifloxacin Hcl In Nacl] Hives    Carafate [Sucralfate]      Dizziness, shortness of breath    Ciprofloxacin Hives and Swelling    Clindamycin/Lincomycin     Crestor [Rosuvastatin]     Erythromycin     Flagyl [Metronidazole]     Gemfibrozil Nausea Only    Iodine     Lipitor [Atorvastatin]     Penicillins     Pravachol [Pravastatin Sodium] Hives    Prednisone     Sulfa Antibiotics      rash    Benicar [Olmesartan Medoxomil] Rash     Face swelling    Lisinopril Other (See Comments)     Cough       Minimum Predicted Vital Capacity:     959          Actual Vital Capacity:      Pt unavailable              Pulmonary History:COPD, CHF/Pulmonary Edema and lung ca  Home Oxygen Therapy:  Unknown, pt currently on RA  Home Respiratory Therapy:Albuterol and Umeclidinium Bromide /per chart  Current Respiratory Therapy:  Albuterol, spiriva  Treatment Type: IS       Respiratory Severity Index(RSI)   Patients with orders for inhalation medications, oxygen, or any therapeutic treatment modality will be placed on Respiratory Protocol. They will be assessed with the first treatment and at least every 72 hours thereafter. The following severity scale will be used to determine frequency of treatment intervention.     Smoking History: No Smoking History = 0    Social History  Social History     Tobacco Use    Smoking status: Never Smoker    Smokeless tobacco: Never Used   Substance Use Topics    Alcohol use: No    Drug use: Never       Recent Surgical History: None = 0  Past Surgical History  Past Surgical History:   Procedure Laterality Date    APPENDECTOMY      CATARACT REMOVAL      right    CHOLECYSTECTOMY      COLONOSCOPY  10/07    COLONOSCOPY  4/16/2014    polyps, diverticulosis    LUNG REMOVAL, PARTIAL  1997    right middle    PACEMAKER INSERTION      SKIN BIOPSY      states negative one off of the face    UPPER GASTROINTESTINAL ENDOSCOPY  10/2011    UPPER GASTROINTESTINAL ENDOSCOPY  7/14/2014    Dilated esophagus       Level of Consciousness: Alert, Follows Commands but Disoriented = 1    Level of Activity: Walking with assistance = 1    Respiratory Pattern: Regular Pattern; RR 8-20 = 0    Breath Sounds: Diminished unilaterally = 1    Sputum   ,  ,    Cough: Strong, spontaneous, non-productive = 0    Vital Signs   BP (!) 145/65   Pulse 66   Temp 97.8 °F (36.6 °C) (Oral)   Resp 24   Ht 5' 8\" (1.727 m)   Wt 133 lb (60.3 kg)   SpO2 96%   BMI 20.22 kg/m²   SPO2 (COPD values may differ): Greater than or equal to 92% on room air = 0    Peak Flow (asthma only): not applicable = 0    RSI: 0-4 = See once and convert to home regimen or discontinue        Plan       Goals: medication delivery    Patient/caregiver was educated on the proper method of use for Respiratory Care Devices:  No: /pt unavailable      Level of patient/caregiver understanding able to:   ? Verbalize understanding   ? Demonstrate understanding       ? Teach back        ? Needs reinforcement       ? No available caregiver               ? Other:     Response to education:  pt unavailable     Is patient being placed on Home Treatment Regimen? Yes /per chart    Does the patient have everything they need prior to discharge?   NA     Comments: pt assessed and chart reviewed    Plan of Care: albuterol prn, spiriva    Electronically signed by James Balderrama RCP on discontinued if patient has a RSI less than 9 and has received no scheduled or as needed treatment for 72  Hrs. Patients Ordered on a Mucolytic Agent:    1. Must always be administered with a bronchodilator. 2. Discontinue if patient experiences worsened bronchospasm, or secretions have lessened to the point that the patient is able to clear them with a cough. Anti-inflammatory and Combination Medications:    1.  If the patient lacks prior history of lung disease, is not using inhaled anti-inflammatory medication at home, and lacks wheezing by examination or by history for at least 24 hours, contact physician for possible discontinuati

## 2020-11-23 NOTE — PROGRESS NOTES
Physical Therapy    Facility/Department: [place] [address] - MED SURG/ORTHO  Initial Assessment/Treatment Session    NAME: Dulce Gonzalez  : 1927  MRN: [id number]    Date of Service: 2020    Discharge Recommendations:  Subacute/Skilled Nursing Facility   PT Equipment Recommendations  Equipment Needed: No  Other: Defer to facility    Assessment   Body structures, Functions, Activity limitations: Decreased functional mobility ; Decreased strength;Decreased safe awareness;Decreased vision/visual deficit; Decreased balance;Decreased endurance  Assessment: Pt is a 80year old female with PMH including Lung Cancer, HLD, HTN, COPD, Afib and Dementia who presented  with confusion and failure to thrive after being found by neighbors. Upon evaluation, pt presents with above deficits requiring CGA to ambulate 20ft. Pt is limited by confusion, A&Ox1 at the time of evaluation, limiting safety at home. Unable to determine home setup or baseline status secondary to confusion, will need to confirm. Pt would benefit from skilled PT while in acute care setting to address above deficits, recommend SNF at d/c to progress independence as able. If pt to return home, anticipate would require 24/7 supervision/assist secondary to impaired safety awareness. Treatment Diagnosis: Impaired mobility and safety awareness. Prognosis: Good  Decision Making: Low Complexity  PT Education: Goals;PT Role;Plan of Care;General Safety;Transfer Training;Disease Specific Education; Functional Mobility Training;Gait Training;Family Education  Patient Education: Pt reoriented and educated regarding PT Role and goals - will require reinforcement secondary to confusion. Barriers to Learning: Confusion, [place]  REQUIRES PT FOLLOW UP: Yes  Activity Tolerance  Activity Tolerance: Patient Tolerated treatment well;Patient limited by cognitive status  Activity Tolerance: VSS throughout, no c/o chest pain, SOB or dizziness.        Patient Diagnosis(es): The primary encounter diagnosis was Confusion. Diagnoses of Orthostatic hypotension and Risk for falls were also pertinent to this visit. has a past medical history of Allergic rhinitis, Arthritis, Atrial fibrillation ([address] CAD (coronary artery disease), CHF (congestive heart failure) ([address] Confusion, COPD (chronic obstructive pulmonary disease) ([address] Diverticulosis, GERD (gastroesophageal reflux disease), Headache(784.0), Hiatal hernia, HTN, Hyperlipidemia, Lung cancer ([facility] Macular degeneration, Osteoporosis, Pneumonia, and Pulmonary Hypertension. has a past surgical history that includes Appendectomy; Cholecystectomy; Lung removal, partial (1997); Pacemaker insertion; Cataract removal; Upper gastrointestinal endoscopy (10/2011); skin biopsy; Colonoscopy (10/07); Colonoscopy (4/16/2014); and Upper gastrointestinal endoscopy (7/14/2014). Restrictions  Restrictions/Precautions  Restrictions/Precautions: Fall Risk, General Precautions  Position Activity Restriction  Other position/activity restrictions: Up with Assist, PPM  Vision/Hearing  Vision: Impaired(Unable to determine, appears impaired.)  Hearing: Exceptions to WFL([place])     Subjective  General  Chart Reviewed: Yes  Patient assessed for rehabilitation services?: Yes  Additional Pertinent Hx: PPM  Response To Previous Treatment: Not applicable  Family / Caregiver Present: No  Referring Practitioner: Angela Griffiths MD  Referral Date : 11/23/20  Diagnosis: Confusion, failure to thrive. Follows Commands: Impaired  General Comment  Comments: RN cleared pt for therapy, handoff from OT  Subjective  Subjective: Pt sitting at EOB with OT, confused but agreeable to PT Evaluation. \"Is that a baby in a stroller over there? \" Pt also appears to be having hallucinations.   Pain Screening  Patient Currently in Pain: Denies  Vital Signs  Pulse: 91  BP: (!) 151/87(Pt tensing up during BP reading)  BP Location: Right upper arm  Patient Position: Sitting  Patient Currently in Pain: Denies  Oxygen Therapy  SpO2: 95 %  Pulse Oximeter Device Mode: Intermittent  O2 Device: None (Room air)  Pre Treatment Pain Screening  Intervention List: Patient able to continue with treatment    Orientation  Orientation  Overall Orientation Status: Impaired  Orientation Level: Oriented to person  Social/Functional History  Social/Functional History  Lives With: Alone  Type of Home: House  Home Layout: One level  Home Access: Stairs to enter with rails  Entrance Stairs - Number of Steps: 5 CAROLANN  Entrance Stairs - Rails: Both  Bathroom Shower/Tub: Walk-in shower  Bathroom Toilet: Standard  Home Equipment: Rolling walker, Cane  ADL Assistance: Independent  Homemaking Assistance: (unknown)  Homemaking Responsibilities: (unknown)  Ambulation Assistance: Independent(without AD)  Transfer Assistance: Independent  Active : No  Additional Comments: Unable to obtain PLOF and home setup secondary to pt confusion. Per chart review, pt's  had recently passed. Cognition   Cognition  Overall Cognitive Status: Exceptions  Arousal/Alertness: Appropriate responses to stimuli  Following Commands: Follows one step commands with repetition; Follows one step commands with increased time  Attention Span: Attends with cues to redirect  Memory: Decreased short term memory;Decreased recall of biographical Information;Decreased recall of recent events  Safety Judgement: Decreased awareness of need for assistance;Decreased awareness of need for safety  Problem Solving: Assistance required to identify errors made;Assistance required to generate solutions;Decreased awareness of errors;Assistance required to implement solutions;Assistance required to correct errors made  Insights: Decreased awareness of deficits  Initiation: Requires cues for some  Sequencing: Requires cues for some    Objective     Observation/Palpation  Posture: Fair    AROM RLE (degrees)  RLE AROM: WFL  RLE General AROM: Unable to perform formal assessment secondary to impaired command following  PROM LLE (degrees)  LLE General PROM: Unable to perform formal assessment secondary to impaired command following  AROM LLE (degrees)  LLE AROM : WFL  Strength RLE  Strength RLE: WFL  Comment: Unable to perform formal assessment secondary to impaired command following  Strength LLE  Strength LLE: WFL  Comment: Unable to perform formal assessment secondary to impaired command following  Tone RLE  RLE Tone: Normotonic  Tone LLE  LLE Tone: Normotonic  Sensation  Overall Sensation Status: (Unable to perform formal assessment secondary to impaired command following and confusion)  Bed mobility  Supine to Sit: Unable to assess  Sit to Supine: Unable to assess  Comment: Pt sitting at EOB upon arrival and in chair at end of session. Transfers  Sit to Stand: Minimal Assistance  Stand to sit: Minimal Assistance  Comment: Pt requires assist for lift and immediate balance. Cues for hand placement with hand over hand at times. Ambulation  Ambulation?: Yes  More Ambulation?: No  Ambulation 1  Surface: level tile  Device: No Device;Hand-Held Assist  Assistance: Contact guard assistance  Quality of Gait: Pt ambulated initiall with HHA progressing to no UE support, forward flexed posture with decreased step length, mild inc in M/L sway with no overt LOB, decreased gait speed. Distance: 20ft x 2     Balance  Posture: Fair  Sitting - Static: Good  Sitting - Dynamic: Good;-  Standing - Static: Fair  Standing - Dynamic: Fair  Comments: Requires CGA to ambulate without AD        Plan   Plan  Times per week: 3-5x/wk  Times per day: Daily  Current Treatment Recommendations: Strengthening, Balance Training, Functional Mobility Training, Gait Training, Endurance Training, Safety Education & Training, Patient/Caregiver Education & Training  Safety Devices  Type of devices:  All fall risk precautions in place, Call light within reach, Chair alarm in place, Gait belt, Nurse notified, Left in chair, Patient at risk for falls  Restraints  Initially in place: No     AM-PAC Score  AM-PAC Inpatient Mobility Raw Score : 17 (11/23/20 1412)  AM-PAC Inpatient T-Scale Score : 42.13 (11/23/20 1412)  Mobility Inpatient CMS 0-100% Score: 50.57 (11/23/20 1412)  Mobility Inpatient CMS G-Code Modifier : CK (11/23/20 1412)          Goals  Short term goals  Time Frame for Short term goals: 1 week, 11/30/20 unless otherwise noted  Short term goal 1: Pt will perform bed mobility with supervision  Short term goal 2: Pt will perform sit<>stand transfer with supervision  Short term goal 3: Pt will ambulate 100ft with LRAD and supervision  Short term goal 4: By 11/26, pt will tolerate x 10-15 reps BLE exercise to assist with functional transfers and ambulation. Patient Goals   Patient goals : Unable to state secondary to confusion       Therapy Time   Individual Concurrent Group Co-treatment   Time In 1324         Time Out 1344         Minutes 20         Timed Code Treatment Minutes: 10 Minutes(10 minute evaluation)       Cain Pena PT     If pt is unable to be seen after this session, please let this note serve as discharge summary. Please see case management note for discharge disposition. Thank you.

## 2020-11-23 NOTE — CONSULTS
Pharmacy Note  Warfarin Consult  Dx: AFib  Goal INR range 2-3  Home Warfarin dose: Per recent outpt note on 11/17: Take 5 mg (one tablet) two days a week: Mondays and Thursdays   Take 2.5 mg (half tablet) five days a week: [place], Fridays, Saturdays, sundays     Date  INR  Warfarin  11/22              1.62                 5 mg     Recommend Warfarin 5 mg tonight x1. Daily INR ordered. Rx will continue to manage therapy per consult order.   Andre Kearns, PharmD 11/22/20  9:07 PM

## 2020-11-23 NOTE — CARE COORDINATION
[facility]      Patient is active with [facility]. I am following for DC needs.         Roe Meraz  Work mobile: [phone]  [facility] office: [phone]

## 2020-11-23 NOTE — PROGRESS NOTES
Hospitalist Progress Note      PCP: Stacy Lebron MD    Date of Admission: 11/22/2020    Chief Complaint: confusion    Hospital Course:   80 y.o. female with PMH of CHF, A fib , ? Dementia presents from home with c/o being found confused by her neighbors ,   As per ED, pts neighbor has HCPOA for the pt ,   pts  passed away a month ago, pt has been living by herself at home and the neighbor has been checking the pt  frequently ,   they have noticed that pt is not taking care of herself well,  Pt is more confused than usual      Currently, she denies any pain     Subjective: no new complaints today. Medications:  Reviewed    Infusion Medications   Scheduled Medications    warfarin  5 mg Oral Once    lisinopril  20 mg Oral Daily    metoprolol succinate  25 mg Oral Daily    sodium chloride flush  10 mL Intravenous 2 times per day    dicyclomine  20 mg Oral Q6H    furosemide  40 mg Oral Daily    linaclotide  145 mcg Oral QAM AC    potassium chloride  20 mEq Oral Daily    famotidine  20 mg Oral Daily    tiotropium  2 puff Inhalation Daily    warfarin (COUMADIN) daily dosing (placeholder)   Other RX Placeholder     PRN Meds: sodium chloride flush, acetaminophen **OR** acetaminophen, polyethylene glycol, promethazine **OR** ondansetron, albuterol    No intake or output data in the 24 hours ending 11/23/20 1704    Physical Exam Performed:    /61   Pulse 70   Temp 96.6 °F (35.9 °C) (Oral)   Resp 22   Ht 5' 8\" (1.727 m)   Wt 137 lb 11.2 oz (62.5 kg)   SpO2 96%   BMI 20.94 kg/m²     General appearance:  No apparent distress, appears stated age and cooperative. HEENT:  Normal cephalic, atraumatic without obvious deformity. Pupils equal, round, and reactive to light. Extra ocular muscles intact. Conjunctivae/corneas clear. Neck: Supple, with full range of motion. No jugular venous distention. Trachea midline. Respiratory:  Normal respiratory effort.  Clear to auscultation, bilaterally without Rales/Wheezes/Rhonchi. Cardiovascular:  Regular rate and rhythm with normal S1/S2 without murmurs, rubs or gallops. Abdomen: Soft, non-tender, non-distended with normal bowel sounds. Musculoskeletal:  No clubbing, cyanosis or edema bilaterally. Skin: few old lacerations, bruises . Neurologic:  Neurovascularly intact without any focal sensory/motor deficits. Cranial nerves: II-XII intact, grossly non-focal.  Psychiatric:  Alert and oriented, thought content confused   Capillary Refill: Brisk,< 3 seconds   Peripheral Pulses: +2 palpable, equal bilaterally     Labs:   Recent Labs     11/22/20  1648 11/23/20  0638   WBC 5.9 5.7   HGB 13.2 13.0   HCT 40.4 39.2    162     Recent Labs     11/22/20  1648 11/23/20  0638    136   K 4.4 4.6   CL 98* 101   CO2 31 31   BUN 17 13   CREATININE 0.7 <0.5*   CALCIUM 10.6 10.1     Recent Labs     11/22/20  1648   AST 23   ALT 14   BILITOT 0.9   ALKPHOS 71     Recent Labs     11/22/20  1944 11/23/20  0638   INR 1.62* 1.49*     Recent Labs     11/22/20  1648   TROPONINI <0.01       Urinalysis:      Lab Results   Component Value Date    NITRU Negative 11/22/2020    WBCUA 0-2 08/27/2020    BACTERIA Rare 01/09/2014    RBCUA 0-2 08/27/2020    BLOODU Negative 11/22/2020    SPECGRAV 1.025 11/22/2020    GLUCOSEU Negative 11/22/2020       Radiology:  CT HEAD WO CONTRAST   Final Result   No acute intracranial abnormality. Generalized atrophy and moderate to severe chronic small vessel ischemic   white matter disease. XR CHEST (2 VW)   Final Result   1. No acute cardiopulmonary process identified.                  Assessment/Plan:    Active Hospital Problems    Diagnosis    Confusion [R41.0]     Dementia/Failure to thrive  - unable to live safely at home  - PT/OT  - CT head negative    Afib  - rate controlled  - continue BB  - pharmacy to dose coumadin    Chronic diastolic heart failure  - appears euvolemic  - continue home lasix    HTN  - well controlled  - continue home ACEi, BB    DVT Prophylaxis: coumadin  Diet: DIET GENERAL;  Dietary Nutrition Supplements: Standard High Calorie Oral Supplement  Code Status: Full Code    PT/OT Eval Status: ordered    Dispo - likely SNF with transition to LTC.  Will be medically ready by tomorrow    Mitzy Machado MD

## 2020-11-23 NOTE — ED NOTES
Report to [place]. Patient taken to the floor via stretcher with personal belonging on stretcher to include clothing and purse. Patient is alert at this time. Patient reports that she \"just saw my  before I came here, he's at home\" reoriented patient that  has passed a month ago. Patient is in denies.       Alejo Ramos RN  11/22/20 2030

## 2020-11-23 NOTE — FLOWSHEET NOTE
11/22/20 2118   Assessment   Charting Type Admission   Neurological   Neuro (WDL) X  (hx of dementia )   Level of Consciousness 0   Orientation Level Disoriented to situation;Disoriented to time;Oriented to person;Disoriented to place   Cognition Short term memory loss;Poor judgement;Poor safety awareness;Poor attention/concentration   Language Clear   [place] Coma Scale   Eye Opening 4   Best Verbal Response 4   Best Motor Response 6   [place] Coma Scale Score 14   Respiratory   Respiratory (WDL) WDL   Cardiac   Cardiac (WDL) X   Heart Sounds S1, S2   Cardiac Rhythm Ventricular Paced   Cardiac Monitor   Telemetry Monitor On No   Telemetry Audible No   Telemetry Alarms Set No   Pacemaker   Pacemaker Yes   Pacemaker Type Permanent   Gastrointestinal   Abdominal (WDL) X   GI Symptoms Cramping;Constipation   Abdomen Inspection Soft   Last BM (including prior to admit)   (patient unsure )   Tenderness Soft;Tenderness   RUQ Bowel Sounds Active   LUQ Bowel Sounds Active   RLQ Bowel Sounds Active   LLQ Bowel Sounds Active   Peripheral Vascular   Peripheral Vascular (WDL) WDL   Edema Right lower extremity; Left lower extremity   RLE Edema +2;Pitting   LLE Edema +2;Trace   Skin Color/Condition   Skin Color/Condition (WDL) WDL   Skin Integrity   Skin Integrity (WDL) X   Skin Integrity Abrasion   Location RLE   Preventative Dressing No   Skin Fold Management No   Multiple Skin Integrity Sites Yes   Skin Integrity Site 2   Skin Integrity Location 2 Other (Comment); Redness  (possible DTI/pressure wound see flow sheets )   Location 2 buttocks    Preventative Dressing No   Musculoskeletal   Musculoskeletal (WDL) X   RL Extremity Weakness; Unsteady   LL Extremity Weakness; Unsteady   Genitourinary   Genitourinary (WDL) WDL   Flank Tenderness No   Suprapubic Tenderness No   Dysuria No   Anus/Rectum   Anus/Rectum (WDL) WDL   Psychosocial   Psychosocial (WDL) X   Patient Behaviors Uncooperative

## 2020-11-23 NOTE — CARE COORDINATION
Pt confused. Per nursing staff, spouse recently . Emergency contact updated by CM and spouse removed as primary contact. Pt nsg staff, pt is confused. VM left for family, Mark Al, for call back to complete initial CM assessment.

## 2020-11-23 NOTE — PLAN OF CARE
Problem: Nutrition  Goal: Optimal nutrition therapy  Outcome: Ongoing  Note: Nutrition Problem #1: Predicted inadequate energy intake  Intervention: Food and/or Nutrient Delivery: Continue Current Diet, Start Oral Nutrition Supplement  Nutritional Goals: Pt will consume greater than 50% of meals and ONS this admission w/o further skin breakdown

## 2020-11-23 NOTE — PROGRESS NOTES
Occupational Therapy   Occupational Therapy Initial Assessment/Treatment  Date: 2020   Patient Name: Trent Escalera  MRN: [id number]     : 1927    Date of Service: 2020    Discharge Recommendations:  Subacute/Skilled Nursing Facility  OT Equipment Recommendations  Other: defer to next level of care     If patient discharges prior to next session this note will serve as a discharge summary. Please see below for the latest assessment towards goals. Assessment   Performance deficits / Impairments: Decreased functional mobility ; Decreased safe awareness;Decreased balance;Decreased cognition;Decreased ADL status; Decreased high-level IADLs;Decreased endurance;Decreased strength  Assessment: Patient is a 80yr old female admitted for confusion and inability to manage self-care needs at home. Patient limited by decreased cognition and impaired safety awareness. Patient required CGA-Min A for ADLs. Patient is a high risk and unsafe to return home alone. OT recommending ongoing therapy in order to increase functional status  Prognosis: Guarded; Fair  Decision Making: Medium Complexity  OT Education: OT Role;Plan of Care;Precautions; ADL Adaptive Strategies;Orientation  Barriers to Learning: cognition  REQUIRES OT FOLLOW UP: Yes  Activity Tolerance  Activity Tolerance: Treatment limited secondary to agitation;Treatment limited secondary to decreased cognition  Safety Devices  Safety Devices in place: Yes  Type of devices: All fall risk precautions in place; Left in bed;Call light within reach;Nurse notified(pt with PT at end of session)           Patient Diagnosis(es): The primary encounter diagnosis was Confusion. Diagnoses of Orthostatic hypotension and Risk for falls were also pertinent to this visit.      has a past medical history of Allergic rhinitis, Arthritis, Atrial fibrillation ([address] CAD (coronary artery disease), CHF (congestive heart failure) ([facility] [address] Confusion, COPD (chronic obstructive pulmonary disease) ([facility] [address] Diverticulosis, GERD (gastroesophageal reflux disease), Headache(784.0), Hiatal hernia, HTN, Hyperlipidemia, Lung cancer ([facility] [address] Macular degeneration, Osteoporosis, Pneumonia, and Pulmonary Hypertension. has a past surgical history that includes Appendectomy; Cholecystectomy; Lung removal, partial (1997); Pacemaker insertion; Cataract removal; Upper gastrointestinal endoscopy (10/2011); skin biopsy; Colonoscopy (10/07); Colonoscopy (4/16/2014); and Upper gastrointestinal endoscopy (7/14/2014). Restrictions  Restrictions/Precautions  Restrictions/Precautions: Fall Risk, General Precautions  Position Activity Restriction  Other position/activity restrictions: Up with Assist, PPM    Subjective   General  Chart Reviewed: Yes, Progress Notes, History and Physical, Imaging  Patient assessed for rehabilitation services?: Yes  Additional Pertinent Hx: hx of dementia  Family / Caregiver Present: No  Referring Practitioner: Lucia Cadet MD  Diagnosis: Failure to Thrive, Confusion  Subjective  Subjective: Patient attempted to get out of bed without assist, bed alarm going off. OT assisted patient to bathroom. Patient in agreeable to OT evaluation.   General Comment  Comments: RN okay for therapy  Patient Currently in Pain: Denies  Vital Signs  Pulse: 91  BP: (!) 151/87(Pt tensing up during BP reading)  BP Location: Right upper arm  Patient Position: Sitting  Patient Currently in Pain: Denies  Height and Weight  Height: 5' 8\" (172.7 cm)  Weight: 137 lb 11.2 oz (62.5 kg)  Weight Method: Standing scale  BSA (Calculated - sq m): 1.73 sq meters  BMI (Calculated): 21  Oxygen Therapy  SpO2: 95 %  Pulse Oximeter Device Mode: Intermittent  O2 Device: None (Room air)  Social/Functional History  Social/Functional History  Lives With: Alone  Type of Home: House  Home Layout: One level  Home Access: Stairs to enter with rails  Entrance Stairs - Number of Steps: 5 CAROLANN  Entrance Stairs - Rails: Both  Bathroom Shower/Tub: Walk-in shower  Bathroom Toilet: Standard  Home Equipment: Rolling walker, Cane  ADL Assistance: Independent  Homemaking Assistance: (unknown)  Homemaking Responsibilities: (unknown)  Ambulation Assistance: Independent(without AD)  Transfer Assistance: Independent  Active : No  Additional Comments: Unable to obtain PLOF and home setup secondary to pt confusion. Per chart review, pt's  had recently passed. Objective   Vision: Impaired  Vision Exceptions: Wears glasses for reading  Hearing: Exceptions to [facility]  Hearing Exceptions: Hard of hearing/hearing concerns;Bilateral hearing aid(pt reports hearing aids, but they are not with her)    Orientation  Overall Orientation Status: Impaired  Orientation Level: Disoriented to situation;Oriented to person;Disoriented to place; Disoriented to time  Observation/Palpation  Posture: Fair  Balance  Sitting Balance: Supervision  Standing Balance: Contact guard assistance(without AD)  Standing Balance  Activity: functional mobility to bathroom, toileting  Functional Mobility  Functional - Mobility Device: No device  Activity: To/from bathroom  Assist Level: Contact guard assistance  Functional Mobility Comments: patient unsteady, but agitated about using RW;  Toilet Transfers  Toilet - Technique: Ambulating  Equipment Used: Standard toilet  Toilet Transfer: Minimal assistance  Toilet Transfers Comments: + grab bar  ADL  Toileting:  Moderate assistance(forgetful with sequencing ADLs; min A for thoroughness with hygiene)  Tone RUE  RUE Tone: Normotonic  Tone LUE  LUE Tone: Normotonic  Coordination  Movements Are Fluid And Coordinated: Yes     Bed mobility  Supine to Sit: Stand by assistance  Sit to Supine: Stand by assistance  Comment: increased time and effort  Transfers  Sit to stand: Contact guard assistance;Minimal assistance  Stand to sit: Minimal assistance(slow decent)     Cognition  Overall Cognitive Status: Exceptions  Arousal/Alertness: Appropriate responses to stimuli  Following Commands: Follows one step commands with repetition; Follows one step commands with increased time  Attention Span: Attends with cues to redirect  Memory: Decreased short term memory;Decreased recall of biographical Information;Decreased recall of recent events  Safety Judgement: Decreased awareness of need for assistance;Decreased awareness of need for safety  Problem Solving: Assistance required to identify errors made;Assistance required to generate solutions;Decreased awareness of errors;Assistance required to implement solutions;Assistance required to correct errors made  Insights: Decreased awareness of deficits  Initiation: Requires cues for some  Sequencing: Requires cues for some  Cognition Comment: Decreased thoroughness with hygiene noted during ADLs; Sensation  Overall Sensation Status: (unable to formally assess due to impaired cognition)                               Plan   Plan  Times per week: 3-5x/wk  Times per day: Daily  Current Treatment Recommendations: Strengthening, Patient/Caregiver Education & Training, ROM, Balance Training, Functional Mobility Training, Safety Education & Training, Self-Care / ADL, Endurance Training                          AM-PAC Score        AM-[facility] Inpatient Daily Activity Raw Score: 18 (11/23/20 1420)  AM-PAC Inpatient ADL T-Scale Score : 38.66 (11/23/20 1420)  ADL Inpatient CMS 0-100% Score: 46.65 (11/23/20 1420)  ADL Inpatient CMS G-Code Modifier : CK (11/23/20 1420)    Goals  Short term goals  Time Frame for Short term goals: by 1 week 11/30/20  Short term goal 1: Pt will complete toileting with supervision  Short term goal 2: Pt will complete bathing with supervision  Short term goal 3: Pt will complete functional transfers with supervision  Short term goal 4: Pt will tolerate x15 reps of BUE exercises in order to increase functional strength for ADL participation.        Therapy Time

## 2020-11-23 NOTE — H&P
Hospital Medicine History & Physical      PCP: Elena Roca MD    Date of Admission: 11/22/2020    Date of Service: Pt seen/examined on 11/22/2020   and Placed in Observation. Chief Complaint:  Confusion       History Of Present Illness:      80 y.o. female with PMH of CHF, A fib , ?  Dementia presents from home with c/o being found confused by her neighbors ,   As per ED, pts neighbor has HCPOA for the pt ,   pts  passed away a month ago, pt has been living by herself at home and the neighbor has been checking the pt  frequently ,   they have noticed that pt is not taking care of herself well,  Pt is more confused than usual     Currently, she denies any pain     Past Medical History:          Diagnosis Date    Allergic rhinitis     Arthritis     states she has Osteo arthritis    Atrial fibrillation ([address]     Dr. Pepper Mueller CAD (coronary artery disease)     states AFib, 2 valves seeping, pacemaker    CHF (congestive heart failure) ([address]     states she was dx with it once    Confusion 11/22/2020    COPD (chronic obstructive pulmonary disease) ([address]     severe 6/09, moderately Severe 12/11 PFT    Diverticulosis     GERD (gastroesophageal reflux disease)     states she goes to [email or web address])     Hiatal hernia     gastritis    HTN     states 2 years ago and on medication for it    Hyperlipidemia     states she has a history not on medication now    Lung cancer [facility]) 1997    right middle lobectomy    Macular degeneration     right eye    Osteoporosis     Dr Skip Ham Pneumonia     Pulmonary Hypertension        Past Surgical History:          Procedure Laterality Date    APPENDECTOMY      CATARACT REMOVAL      right    CHOLECYSTECTOMY      COLONOSCOPY  10/07    COLONOSCOPY  4/16/2014    polyps, diverticulosis    LUNG REMOVAL, PARTIAL  1997    right middle    PACEMAKER INSERTION      SKIN BIOPSY      states negative one off of the face    UPPER GASTROINTESTINAL (MULTIVITAMINS PO) Take 1 tablet by mouth daily. 5/21/10   Historical Provider, MD   Calcium-Vitamin D (CALTRATE 600 PLUS-VIT D PO) Take 1 tablet by mouth daily. 5/21/10   Historical Provider, MD       Allergies:  Latex; Advair [fluticasone-salmeterol]; Alendronate sodium; Mollie Darin; Avelox [moxifloxacin hcl in nacl]; Carafate [sucralfate]; Ciprofloxacin; Clindamycin/lincomycin; Crestor [rosuvastatin]; Erythromycin; Flagyl [metronidazole]; Gemfibrozil; Iodine; Lipitor [atorvastatin]; Penicillins; Pravachol [pravastatin sodium]; Prednisone; Sulfa antibiotics; Benicar [olmesartan medoxomil]; and Lisinopril    Social History:      The patient currently lives at home     TOBACCO:   reports that she has never smoked. She has never used smokeless tobacco.  ETOH:   reports no history of alcohol use. E-Cigarettes Vaping or Juuling     Questions Responses    Vaping Use     Start Date     Does device contain nicotine? Quit Date     Vaping Type             Family History:    * Reviewed in detail and negative for DM, CAD, , CVA. Positive as follows:        Problem Relation Age of Onset    Heart Disease Mother     Cancer Sister         lung  and breast       REVIEW OF SYSTEMS:   Pertinent positives as noted in the HPI. All other systems reviewed and negative. PHYSICAL EXAM PERFORMED:    BP (!) 183/98   Pulse 65   Temp 98 °F (36.7 °C) (Oral)   Resp 28   Ht 5' 8\" (1.727 m)   Wt 133 lb (60.3 kg)   SpO2 98%   BMI 20.22 kg/m²     General appearance:  No apparent distress, appears stated age and cooperative. HEENT:  Normal cephalic, atraumatic without obvious deformity. Pupils equal, round, and reactive to light. Extra ocular muscles intact. Conjunctivae/corneas clear. Neck: Supple, with full range of motion. No jugular venous distention. Trachea midline. Respiratory:  Normal respiratory effort. Clear to auscultation, bilaterally without Rales/Wheezes/Rhonchi.   Cardiovascular:  Regular rate and rhythm cont lasix     HTN - cont ACEI, BB     DVT Prophylaxis: lovenox   Diet: No diet orders on file  Code Status: Prior    PT/OT Eval Status:     Dispo - 2 days        Gely Lowry MD    Thank you Joel Mayer MD for the opportunity to be involved in this patient's care. If you have any questions or concerns please feel free to contact me at [phone].

## 2020-11-23 NOTE — CARE COORDINATION
CM notes therapy recs for SNF, referral faxed to [facility]. LVM with Sara in admissions.  CM following-Sapphire Spears RN

## 2020-11-23 NOTE — CARE COORDINATION
CASE MANAGEMENT INITIAL ASSESSMENT      Reviewed chart and completed assessment with: Justo Ogden face to face. Order to see pt for: unsafe at private residence alone   Explained Case Management role/services. Primary contact information: Friend Mandi Nuñez- Bringing in official documentation tomorrow. [phone]. Health Care Decision Maker:  Who do you trust or have selected to make healthcare decisions for you? Name:  Marisol Colon [facility], [phone]. Can this person be reached and be able to respond quickly, such as within a few minutes or hours? Yes  Who would be your back-up decision maker? Name  Kenneth Francis [phone]. Admit date/status: Observation, 20  Diagnosis: Confusion   Is this a Readmission?:  No      Insurance: Medicare Primary and [address] required for SNF: No       3 night stay required: No    Living arrangements, Adls, care needs, prior to admission: Lives in a mobile home alone.  recently . Needs help with ADL's. Friends drive and help her with food and prescriptions. Transportation: Friends     [address] at home:  None  Services in the home and/or outpatient, prior to admission: Active with [address] [facility]. Spoke to [facility] with [facility] and she confirmed. PT/OT recs: None seen at this time. Hospital Exemption Notification (HEN): needed for SNF, not initiated. Barriers to discharge: None    Plan/comments: CM  Spoke to both friends Ray Jefferson and Brock Mattbetzaida and they would both like [employer]. Will watch for therapy recs and place referral to OVM when appropriate.  CM following-Sapphire Spears RN      ECOC on chart for MD signature

## 2020-11-23 NOTE — PROGRESS NOTES
Comprehensive Nutrition Assessment    Type and Reason for Visit:  Initial, Positive Nutrition Screen, Wound    Nutrition Recommendations/Plan:   1. Continue general diet   2. Add Ensure TID for wound healing   3. Obtain actual weight  4. Consider addition of bowel regimen - MD to advise   5. Monitor nutrition adequacy, pertinent labs, bowel habits, wt changes, and clinical progress    Nutrition Assessment:  Pt admitted following neighbors finding pt confused, MD ? dementia. Pt nutritionally at risk AEB wounds. Pt currently on general diet. Wound care consulted. Will add ONS for wound healing and supplemental nutrition. Noted constipation, consider addition of bowel regimen. Will continue to monitor for further nutrition intervention. Malnutrition Assessment:  Malnutrition Status: At risk for malnutrition (Comment)      Estimated Daily Nutrient Needs:  Energy (kcal):  7063-7994 kcal; Weight Used for Energy Requirements:  Ideal(64 kg)     Protein (g):  76-96 g; Weight Used for Protein Requirements:  Ideal(1.2-1.5 g/kg)        Fluid (ml/day):   ; Method Used for Fluid Requirements:  1 ml/kcal      Nutrition Related Findings:  Cramping and constipation, active BS, +2 BLE edema      Wounds:  Wound Consult Pending       Current Nutrition Therapies:    DIET GENERAL;     Anthropometric Measures:  · Height: 5' 8\" (172.7 cm)  · Current Body Weight: 133 lb (60.3 kg)   · Usual Body Weight: 141 lb (64 kg)(bed scale 8/21/20)     · Ideal Body Weight: 140 lbs; % Ideal Body Weight 95 %   · BMI: 20.2   · BMI Categories: Underweight (BMI less than 22) age over 72       Nutrition Diagnosis:   · Predicted inadequate energy intake related to increase demand for energy/nutrients as evidenced by wounds      Nutrition Interventions:   Food and/or Nutrient Delivery:  Continue Current Diet, Start Oral Nutrition Supplement  Nutrition Education/Counseling:  No recommendation at this time   Coordination of Nutrition Care:  Continue to monitor while inpatient    Goals:  Pt will consume greater than 50% of meals and ONS this admission w/o further skin breakdown       Nutrition Monitoring and Evaluation:   Food/Nutrient Intake Outcomes:  Food and Nutrient Intake, Supplement Intake  Physical Signs/Symptoms Outcomes:  Biochemical Data, Weight, Constipation     Discharge Planning:    Continue current diet, Continue Oral Nutrition Supplement     Electronically signed by Nguyễn Godoy MS, RD, LD on 11/23/20 at 12:43 PM EST    Contact: [phone]

## 2020-11-24 NOTE — PROGRESS NOTES
Report called to Bravo at [facility]. Bravo verbalized understanding and was provided full report on patient.

## 2020-11-24 NOTE — PROGRESS NOTES
RN was informed in report that POA, pt neighbor, notified staff that patient has a \"significant amount of money like $1000 in her purse. \" Security was notified, but patient refused to have purse searched/money locked up. Patient refused security and staff to touch purse and obtain money that was stated to be in purse. Writer notified security of situation today. Security verbalized understanding and attempting to communicate with patient again today to search purse. Will continue to monitor patient/situation. Addendum [address]: Security provided update to RN. Security is \"pretty sure they counted $1320 in her purse but there could be more. \" Patient did not allow security to collect and secure cash. Patient's money and belongings remain with patient in patient's purse.

## 2020-11-24 NOTE — PROGRESS NOTES
Patient educated on all discharge orders including medications, prescriptions, follow up appointments and general nursing care interventions. Patient's IV access removed. Patient's belongings sent with patient, including purse with known money. Patient verbalized understanding of all discharge instructions. Patient discharged via stretcher/transport in stable condition.

## 2020-11-24 NOTE — PLAN OF CARE
Problem: Falls - Risk of:  Goal: Will remain free from falls  Description: Will remain free from falls  Outcome: Ongoing   Bed in lowest position, wheels locked, 2/4 side rails up, nonskid footwear on. Bed/ chair check alarm in place, call light within reach. Pt instructed to call out when needing assistance. Pt stated understanding. Nurse will continue to monitor.

## 2020-11-24 NOTE — CARE COORDINATION
CASE MANAGEMENT DISCHARGE SUMMARY      Discharge to: [facility]    Precertification completed: waived  Hospital Exemption Notification (HENS) completed: yes      Transportation: ambulance  Agency used:  [address] up time: 1700   Ambulance form completed: Yes    Confirmed discharge plan with:     Patient: yes     Family, name and contact number: Lucila HANSEN, name:  DHAVAL/AVS faxed   Phone number for report to facility: [phone]     RN, name: Jim Johnson RN    Note: Discharging nurse to complete DHAVAL, reconcile AVS, and place final copy with patient's discharge packet. RN to ensure that written prescriptions for  Level II medications are sent with patient to the facility as per protocol.     Betty Stubbs RN

## 2020-11-24 NOTE — PROGRESS NOTES
Pharmacy Note  Warfarin Consult  Dx: AFib  Goal INR range 2-3  Home Warfarin dose: Per recent outpt note on 11/17: Take 5 mg (one tablet) two days a week: Mondays and Thursdays   Take 2.5 mg (half tablet) five days a week: [place], Fridays, Saturdays, sundays     Date  INR  Warfarin  11/22              1.62                 5 mg (not given per [facility] note)  11/23              1.49                 5 mg   11/24              1.70                 5 mg    Recommend Warfarin 5 mg tonight x1. Daily INR ordered. Rx will continue to manage therapy per consult order.   Mariel Goldmann, PharmD 11/24/2020  9:54 AM

## 2020-11-24 NOTE — DISCHARGE SUMMARY
Hospital Medicine Discharge Summary    Patient ID: Ramírez Cifuentes      Patient's PCP: Rossana Gerard MD    Admit Date: 11/22/2020     Discharge Date:   11/24/20    Admitting Physician: Astrid Flores MD     Discharge Physician: Carol Flores MD     Discharge Diagnoses: Active Hospital Problems    Diagnosis    Confusion [R41.0]       The patient was seen and examined on day of discharge and this discharge summary is in conjunction with any daily progress note from day of discharge. Hospital Course:   80 y. o. female with PMH of CHF, A fib , ? Dementia presents from home with c/o being found confused by her neighbors ,   As per ED, pts neighbor has HCPOA for the pt ,   pts  passed away a month ago, pt has been living by herself at home and the neighbor has been checking the pt  frequently ,   they have noticed that pt is not taking care of herself well,  Pt is more confused than usual      Currently, she denies any pain     Dementia/Failure to thrive  - unable to live safely at home  - PT/OT  - CT head negative     Afib  - rate controlled  - continue BB  - pharmacy to dose coumadin     Chronic diastolic heart failure  - appears euvolemic  - continue home lasix     HTN  - well controlled  - continue home ACEi, BB    Physical Exam Performed:     /71   Pulse 98   Temp 97.9 °F (36.6 °C) (Oral)   Resp 16   Ht 5' 8\" (1.727 m)   Wt 137 lb 11.2 oz (62.5 kg)   SpO2 98%   BMI 20.94 kg/m²       General appearance:  No apparent distress, appears stated age and cooperative. HEENT:  Normal cephalic, atraumatic without obvious deformity. Pupils equal, round, and reactive to light.  Extra ocular muscles intact. Conjunctivae/corneas clear. Neck: Supple, with full range of motion. No jugular venous distention. Trachea midline. Respiratory:  Normal respiratory effort. Clear to auscultation, bilaterally without Rales/Wheezes/Rhonchi.   Cardiovascular:  Regular rate and rhythm with normal S1/S2 without murmurs, rubs or gallops. Abdomen: Soft, non-tender, non-distended with normal bowel sounds. Musculoskeletal:  No clubbing, cyanosis or edema bilaterally.    Skin: few old lacerations, bruises .  Neurologic:  Neurovascularly intact without any focal sensory/motor deficits. Cranial nerves: II-XII intact, grossly non-focal.  Psychiatric:  Alert and oriented, thought content confused   Capillary Refill: Brisk,< 3 seconds   Peripheral Pulses: +2 palpable, equal bilaterally     Labs: For convenience and continuity at follow-up the following most recent labs are provided:      CBC:    Lab Results   Component Value Date    WBC 5.7 11/23/2020    HGB 13.0 11/23/2020    HCT 39.2 11/23/2020     11/23/2020       Renal:    Lab Results   Component Value Date     11/23/2020    K 4.6 11/23/2020     11/23/2020    CO2 31 11/23/2020    BUN 13 11/23/2020    CREATININE <0.5 11/23/2020    CALCIUM 10.1 11/23/2020         Significant Diagnostic Studies    Radiology:   CT HEAD WO CONTRAST   Final Result   No acute intracranial abnormality. Generalized atrophy and moderate to severe chronic small vessel ischemic   white matter disease. XR CHEST (2 VW)   Final Result   1. No acute cardiopulmonary process identified.                 Consults:     IP CONSULT TO HOSPITALIST  IP CONSULT TO PHARMACY  IP CONSULT TO SOCIAL WORK    Disposition:  SNF     Condition at Discharge: Stable    Discharge Instructions/Follow-up:  Follow up with PCP within 1-2 weeks    Code Status:  Full Code     Activity: activity as tolerated    Diet: regular diet      Discharge Medications:     Current Discharge Medication List           Details   metoprolol succinate (TOPROL XL) 25 MG extended release tablet Take 1 tablet by mouth daily  Qty: 30 tablet, Refills: 3      warfarin (COUMADIN) 2.5 MG tablet Take 1 tablet by mouth daily  Qty: 30 tablet, Refills: 3      hydrocortisone (ANUSOL-HC) 2.5 % rectal cream Place rectally 2 times [id number].

## 2020-11-24 NOTE — PROGRESS NOTES
Occupational Therapy  Facility/Department: [place] [address] - MED SURG/ORTHO  Daily Treatment Note  NAME: Reed Perdomo  : 1927  MRN: [id number]    Date of Service: 2020    Discharge Recommendations:  Subacute/Skilled Nursing Facility     Assessment   Performance deficits / Impairments: Decreased functional mobility ; Decreased safe awareness;Decreased balance;Decreased cognition;Decreased ADL status; Decreased high-level IADLs;Decreased endurance;Decreased strength  Assessment: Pt continues to be limited by cognition, poor safety awareness. Pt perseverating on her purse, insistent she carry it with her at all times. Pt unsteady, requiring min-mod A without AD to stand and CGA-min A with RW for functional mobilitly. Pt with poor insight, requires frequent education and redirection. Continue OT per POC. Prognosis: Fair;Guarded  OT Education: OT Role;Plan of Care;Precautions; ADL Adaptive Strategies;Orientation;Transfer Training  REQUIRES OT FOLLOW UP: Yes  Activity Tolerance  Activity Tolerance: Treatment limited secondary to decreased cognition  Safety Devices  Safety Devices in place: Yes  Type of devices: Left in chair;Chair alarm in place;Call light within reach;Nurse notified;Gait belt([employer])         Patient Diagnosis(es): The primary encounter diagnosis was Confusion. Diagnoses of Orthostatic hypotension and Risk for falls were also pertinent to this visit. has a past medical history of Allergic rhinitis, Arthritis, Atrial fibrillation ([address] CAD (coronary artery disease), CHF (congestive heart failure) ([address] Confusion, COPD (chronic obstructive pulmonary disease) ([address] Diverticulosis, GERD (gastroesophageal reflux disease), Headache(784.0), Hiatal hernia, HTN, Hyperlipidemia, Lung cancer ([address] Macular degeneration, Osteoporosis, Pneumonia, and Pulmonary Hypertension. has a past surgical history that includes Appendectomy; Cholecystectomy; Lung removal, partial ();  Pacemaker insertion; Cataract removal; Upper gastrointestinal endoscopy (10/2011); skin biopsy; Colonoscopy (10/07); Colonoscopy (4/16/2014); and Upper gastrointestinal endoscopy (7/14/2014). Restrictions  Restrictions/Precautions  Restrictions/Precautions: Fall Risk, General Precautions  Position Activity Restriction  Other position/activity restrictions: Up with Assist     Subjective   General  Chart Reviewed: Yes, Progress Notes, History and Physical, Imaging  Patient assessed for rehabilitation services?: Yes  Additional Pertinent Hx: hx of dementia  Response to previous treatment: Patient with no complaints from previous session  Family / Caregiver Present: No  Referring Practitioner: Galen Valladares MD  Diagnosis: Failure to Thrive, Confusion    Subjective  Subjective: Pt resting in bed, agreeable to OT treatment with encouragement. Pt insiting on carrying purse with her everywhere, declines to leave it on tray table in her view. Pt states \"we're in [place], I know my way all around here. \"    Vital Signs  Patient Currently in Pain: Denies     Orientation  Orientation  Overall Orientation Status: Impaired  Orientation Level: Oriented to person;Disoriented to place; Disoriented to situation;Disoriented to time     Objective    ADL  Grooming: Minimal assistance;Verbal cueing(handwashing at sink)  Toileting: Minimal assistance(verbal cueing, poor safety)     Balance  Sitting Balance: Supervision  Standing Balance: Minimal assistance(pt mod A handheld without AD, CGA-min A with RW)  Standing Balance  Activity: functional mobility to bathroom, toileting, sink ADLs    Functional Mobility  Functional - Mobility Device: Rolling Walker  Activity: To/from bathroom  Assist Level: Minimal assistance  Functional Mobility Comments: unsteady, first wanting to walk handheld but requiring min-mod A, pt sat back to EOB and agreeable to use RW    Toilet Transfers  Toilet - Technique: Ambulating  Equipment Used: Standard toilet(with use of grab bars)  Toilet Transfer: Contact guard assistance  Toilet Transfers Comments: cues for hand placement, poor safety awareness    Bed mobility  Supine to Sit: Supervision(to L with HOB elevated)     Transfers  Sit to stand: Contact guard assistance  Stand to sit: Contact guard assistance     Cognition  Overall Cognitive Status: Exceptions  Following Commands: Follows one step commands with repetition; Follows one step commands with increased time  Attention Span: Attends with cues to redirect  Memory: Decreased short term memory;Decreased recall of biographical Information;Decreased recall of recent events  Safety Judgement: Decreased awareness of need for assistance;Decreased awareness of need for safety  Problem Solving: Assistance required to identify errors made;Assistance required to generate solutions;Decreased awareness of errors;Assistance required to implement solutions;Assistance required to correct errors made  Insights: Decreased awareness of deficits  Initiation: Requires cues for some  Sequencing: Requires cues for some      Plan   Plan  Times per week: 3-5x/wk  Times per day: Daily  Current Treatment Recommendations: Strengthening, Patient/Caregiver Education & Training, ROM, Balance Training, Functional Mobility Training, Safety Education & Training, Self-Care / ADL, Endurance Training    AM-PAC Score  AM-[facility] Inpatient Daily Activity Raw Score: 17 (11/24/20 1312)  -PAC Inpatient ADL T-Scale Score : 37.26 (11/24/20 1312)  ADL Inpatient CMS 0-100% Score: 50.11 (11/24/20 1312)  ADL Inpatient CMS G-Code Modifier : CK (11/24/20 1312)    Goals  Short term goals  Time Frame for Short term goals: by 1 week 11/30/20  Short term goal 1: Pt will complete toileting with supervision-- ongoing 11/24/20  Short term goal 2: Pt will complete bathing with supervision-- ongoing 11/24/20  Short term goal 3: Pt will complete functional transfers with supervision-- ongoing 11/24/20  Short term goal 4: Pt will tolerate x15 reps of BUE exercises in order to increase functional strength for ADL participation. -- ongoing 11/24/20     Therapy Time   Individual Concurrent Group Co-treatment   Time In 1144         Time Out 1222         Minutes 7716 WINNIE Zarate/ALDO

## 2020-11-24 NOTE — PLAN OF CARE
Consult for possible deep tissue injury. Moisture associated skin damage with old healing scab present. This appears to be related to advanced age and venous stasis. Reported patient has been living at home alone. Patient is incontinnet and currently wearing depends. Recommend:  Clean with bath wipes, foam cleanser or soap and water. Apply moisture barrier to affected area bid. 11/23/20 1800   Wound 11/22/20 Buttocks Mid purple, red, possibly open pressure wound or DTI   Date First Assessed/Time First Assessed: 11/22/20 2100   Primary Wound Type: Pressure Injury  Location: Buttocks  Wound Location Orientation: Mid  Wound Description (Comments): purple, red, possibly open pressure wound or DTI   Wound Image    Wound Etiology Venous  (venous stasis with old healing pressure injury on left butt)   Wound Cleansed Other (Comment)  (bath wipes)   Dressing/Treatment Moisture barrier   Wound Length (cm) 0.2 cm   Wound Width (cm) 0.5 cm   Wound Depth (cm) 0 cm   Wound Surface Area (cm^2) 0.1 cm^2   Wound Volume (cm^3) 0 cm^3   Distance Tunneling (cm) 0 cm   Tunneling Position ___ O'Clock 0   Undermining Starts ___ O'Clock 0   Undermining Ends___ O'Clock 0   Undermining Maxium Distance (cm) 0   Wound Assessment Purple/maroon;Pink/red   Drainage Amount None   Odor None   Minnie-wound Assessment Fragile; Intact   Margins Attached edges; Defined edges     Right and left buttocks:      Wound care to sign off. Re-consult for deterioration.

## 2020-11-24 NOTE — DISCHARGE INSTR - COC
Continuity of Care Form    Patient Name: Marie Ronquillo   :  1927  MRN:  [id number]    Admit date:  2020  Discharge date:  2020      Code Status Order: Full Code   Advance Directives:   [facility] Documentation       Date/Time Healthcare Directive Type of Healthcare Directive Copy in [facility] [address] Agent's Name Healthcare Agent's Phone Number    20 0818  Unknown, patient unable to respond due to medical condition -- -- -- -- --            Admitting Physician:  Anaid Nolasco MD  PCP: Blair Gomez MD    Discharging Nurse: [facility] Unit/Room#: 1259/8941-50  Discharging Unit Phone Number: [phone]      Emergency Contact:   Extended Emergency Contact Information  Primary Emergency Contact: Belen Waters  Home Phone: [phone]  Relation: Other    Past Surgical History:  Past Surgical History:   Procedure Laterality Date    APPENDECTOMY      CATARACT REMOVAL      right    CHOLECYSTECTOMY      COLONOSCOPY  10/07    COLONOSCOPY  2014    polyps, diverticulosis    LUNG REMOVAL, PARTIAL  1997    right middle    PACEMAKER INSERTION      SKIN BIOPSY      states negative one off of the face    UPPER GASTROINTESTINAL ENDOSCOPY  10/2011    UPPER GASTROINTESTINAL ENDOSCOPY  2014    Dilated esophagus       Immunization History:   Immunization History   Administered Date(s) Administered    Influenza A (Z4K3-11) Vaccine PF IM 2009    Influenza Virus Vaccine 2012    Influenza Whole 2009, 2011    Pneumococcal Conjugate 7-valent (Prevnar7) 2012    Pneumococcal Polysaccharide (Euunwehpw04) 10/14/2009    Td, unspecified formulation 2011    Zoster Live (Zostavax) 2009       Active Problems:  Patient Active Problem List   Diagnosis Code    Headache R51.9    COPD (chronic obstructive pulmonary disease) ([facility] J44.9    Lung cancer ([facility] C34.90    HTN (hypertension) I10    Osteoporosis M81.0    Hiatal hernia K44.9    CHF (congestive heart failure) ([facility]) I50.9    Syncopal episodes R55    COPD exacerbation ([facility]) J44.1    PNA (pneumonia) J18.9    Respiratory failure with hypoxia ([facility]) J96.91    Atrial fibrillation ([facility]) I48.91    Chest pain R07.9    CHF (congestive heart failure), NYHA class I, acute on chronic, combined ([facility]) I50.43    Shortness of breath R06.02    Abnormal nuclear cardiac imaging test R93.1    Confusion R41.0       Isolation/Infection:   Isolation            No Isolation          Patient Infection Status       Infection Onset Added Last Indicated Last Indicated By Review Planned Expiration Resolved Resolved By    None active    Resolved    COVID-19 Rule Out 11/24/20 11/24/20 11/24/20 COVID-19 (Ordered)   11/24/20 Rule-Out Test Resulted    COVID-19 Rule Out 08/21/20 08/21/20 08/21/20 COVID-19 (Ordered)   08/22/20 Rule-Out Test Resulted            Nurse Assessment:  Last Vital Signs: BP (!) 169/46   Pulse 88   Temp 98.1 °F (36.7 °C) (Oral)   Resp 16   Ht 5' 8\" (1.727 m)   Wt 137 lb 11.2 oz (62.5 kg)   SpO2 95%   BMI 20.94 kg/m²     Last documented pain score (0-10 scale): Pain Level: 0  Last Weight:   Wt Readings from Last 1 Encounters:   11/23/20 137 lb 11.2 oz (62.5 kg)     Mental Status:  disoriented    IV Access:  - None    Nursing Mobility/ADLs:  Walking   Assisted  Transfer  Assisted  Bathing  Assisted  Dressing  Assisted  Toileting  Assisted  Feeding  Assisted  Med Admin  Assisted  Med Delivery   whole    Wound Care Documentation and Therapy:  Pressure Ulcer 02/07/16 Buttocks Left (Active)   Number of days: 1752       Pressure Ulcer 02/07/16 Buttocks Right (Active)   Number of days: 1752       Wound 11/22/20 Buttocks Mid purple, red, possibly open pressure wound or DTI (Active)   Wound Image   11/23/20 1800   Wound Etiology Venous 11/23/20 2229   Wound Cleansed Other (Comment) 11/23/20 1800   Dressing/Treatment Moisture barrier 11/23/20 1800 Wound Length (cm) 0.2 cm 11/23/20 1800   Wound Width (cm) 0.5 cm 11/23/20 1800   Wound Depth (cm) 0 cm 11/23/20 1800   Wound Surface Area (cm^2) 0.1 cm^2 11/23/20 1800   Wound Volume (cm^3) 0 cm^3 11/23/20 1800   Distance Tunneling (cm) 0 cm 11/23/20 1800   Tunneling Position ___ O'Clock 0 11/23/20 1800   Undermining Starts ___ O'Clock 0 11/23/20 1800   Undermining Ends___ O'Clock 0 11/23/20 1800   Undermining Maxium Distance (cm) 0 11/23/20 1800   Wound Assessment Purple/maroon;Pink/red 11/23/20 1800   Drainage Amount None 11/23/20 1800   Odor None 11/23/20 1800   Minnie-wound Assessment Fragile; Intact 11/23/20 1800   Margins Attached edges; Defined edges 11/23/20 1800   Number of days: 1        Elimination:  Continence:   · Bowel: Yes  · Bladder: Yes  Urinary Catheter: None   Colostomy/Ileostomy/Ileal Conduit: {YES / PH:[id number]}       Date of Last BM: 11/23/2020    Intake/Output Summary (Last 24 hours) at 11/24/2020 1339  Last data filed at 11/24/2020 1210  Gross per 24 hour   Intake 570 ml   Output 0 ml   Net 570 ml     No intake/output data recorded. Safety Concerns:     History of Falls (last 30 days) and At Risk for Falls    Impairments/Disabilities:      Hearing    Nutrition Therapy:  Current Nutrition Therapy:   - Oral Diet:  General    Routes of Feeding: Oral  Liquids: No Restrictions  Daily Fluid Restriction: no  Last Modified Barium Swallow with Video (Video Swallowing Test): not done    Treatments at the Time of Hospital Discharge:   Respiratory Treatments: n/A  Oxygen Therapy:  is not on home oxygen therapy. Ventilator:    - No ventilator support    Rehab Therapies: Physical Therapy and Occupational Therapy  Weight Bearing Status/Restrictions: No weight bearing restirctions  Other Medical Equipment (for information only, NOT a DME order):  walker  Other Treatments: n/A    Patient's personal belongings (please select all that are sent with patient):  Purse (Contains approximately $1320).      RN SIGNATURE:  Electronically signed by Parth Patel RN on 11/24/20 at 3:39 PM EST    CASE MANAGEMENT/SOCIAL WORK SECTION    Inpatient Status Date: ***    Readmission Risk Assessment Score:  Readmission Risk              Risk of Unplanned Readmission:        0           Discharging to Facility/ [address] [place] [address]    [phone]   · Fax:[phone]    / signature: Electronically signed by Chauncey Oseguera RN on 11/24/20 at 2:08 PM EST    PHYSICIAN SECTION    Prognosis: Good    Condition at Discharge: Stable    Rehab Potential (if transferring to Rehab): Good    Recommended Labs or Other Treatments After Discharge: PT/OT, skilled nursing    Physician Certification: I certify the above information and transfer of Catrachito Torrez  is necessary for the continuing treatment of the diagnosis listed and that she requires [place] for less 30 days.      Update Admission H&P: No change in H&P    PHYSICIAN SIGNATURE:  Electronically signed by Sophie Dorado MD on 11/24/20 at 1:39 PM EST

## 2020-12-03 NOTE — ED NOTES
Patient arrived to the ED from the [facility] unresponsive. EMS called at 2985 per report after the patient was found unresponsive. Igel in place. CPR in progress via Antonia Ritchie. IO to BRISEIDA.       Pam Larson RN  12/03/20 1570

## 2020-12-03 NOTE — ED NOTES
7. 0 ETT, 21 at the gum line, placed by Dr. Pérez Palma and medical student with positive color changed. Patient bagged via ETT.      Abigail Odom RN  12/03/20 3322

## 2020-12-03 NOTE — ED NOTES
[facility] in [facility].  ETA 1 hr.     Pawel Schroeder RN  12/03/20 8350

## 2020-12-03 NOTE — ED NOTES
Long term friend Carlos Shipley contacted at information listed in Demographics section. Gertrude Galan contacted from the number provided. Both individuals verbalized understanding of patient condition and release to  home of choice.      Abigail Odom RN  20 [phone]

## 2020-12-03 NOTE — ED NOTES
Dr. Robert Jamison contacted for Dr. Karl Romano. Dr. Robert Jamison to sign the death certificate.      Hipolito Junior RN  12/03/20 5636

## 2020-12-03 NOTE — ED NOTES
Body released to [facility] staff, Jamie Lopez, at this time.       Abigail Odom RN  12/03/20 9578

## 2020-12-03 NOTE — ED NOTES
Pulse check; asystole. No pulse.  CPR resumed     Ana Holt, [address] [place] [address]  12/03/20 0001

## 2020-12-03 NOTE — ED NOTES
Mayela Alejandra with the [facility] office contact. Patient it not a 's case.      Anam Vaca RN  12/03/20 7636

## 2020-12-03 NOTE — ED NOTES
Christina Hsu at [facility] contact. Body is releasead from [facility]. Verification number [id number].      Tomas Austin RN  12/03/20 9497

## 2020-12-03 NOTE — ED PROVIDER NOTES
[facility] Name: Chino Lake  MRN: [id number]  [place] 5/9/1927  Date of evaluation: 12/3/2020  Provider: Betty Griffiths MD    CHIEF COMPLAINT       Chief Complaint   Patient presents with    Cardiac Arrest         HISTORY OF PRESENT ILLNESS   (Location/Symptom, Timing/Onset, Context/Setting, Quality, Duration, Modifying Factors, Severity)  Note limiting factors. Chino Lake is a 80 y.o. female who presents to the emergency department     Patient apparently presented from an extended care facility  We know that she had atrial fib  She also had a pacemaker  She apparently was found unresponsive this morning at about 8 AM presented here at about 8:45 AM being bagged with a [employer] compressor    EMS had reported to me that that given 5 rounds of epinephrine through a interosseous  Patient also was given 1 amp of bicarb  No immediate history was obtained  Patient we did do a check  There was no pulse no complexes on the cardiac monitor  No spontaneous breaths  Patient underwent establishment per protocol of a second interosseous lifeline  This was a number 25mm needle. This was performed 2 cm below the knee the infrapatellar area on the anterior medial surface of the tibia with these on the first attempt  Patient was noted to have an I gel this was replaced with an endotracheal intubation    The history is provided by the EMS personnel and a caregiver. Nursing Notes were reviewed. REVIEW OF SYSTEMS    (2-9 systems for level 4, 10 or more for level 5)     Review of Systems   Reason unable to perform ROS: No review of systems obtained due to her presentation of full CPR and unconscious state. Except as noted above the remainder of the review of systems was reviewed and negative.        PAST MEDICAL HISTORY     Past Medical History:   Diagnosis Date    Allergic rhinitis     Arthritis     states she has Osteo arthritis    Atrial fibrillation ([facility]     Dr. Luda Landeros CAD (coronary artery disease)     states AFib, 2 valves seeping, pacemaker    CHF (congestive heart failure) ([facility]     states she was dx with it once    Confusion 11/22/2020    COPD (chronic obstructive pulmonary disease) ([facility]     severe 6/09, moderately Severe 12/11 PFT    Diverticulosis     GERD (gastroesophageal reflux disease)     states she goes to [facility])     Hiatal hernia     gastritis    HTN     states 2 years ago and on medication for it    Hyperlipidemia     states she has a history not on medication now    Lung cancer [facility]) 1997    right middle lobectomy    Macular degeneration     right eye    Osteoporosis     Dr Fabio Weathers Pneumonia     Pulmonary Hypertension          SURGICAL HISTORY       Past Surgical History:   Procedure Laterality Date    APPENDECTOMY      CATARACT REMOVAL      right    CHOLECYSTECTOMY      COLONOSCOPY  10/07    COLONOSCOPY  4/16/2014    polyps, diverticulosis    LUNG REMOVAL, PARTIAL  1997    right middle    PACEMAKER INSERTION      SKIN BIOPSY      states negative one off of the face    UPPER GASTROINTESTINAL ENDOSCOPY  10/2011    UPPER GASTROINTESTINAL ENDOSCOPY  7/14/2014    Dilated esophagus         CURRENT MEDICATIONS       Previous Medications    ACETAMINOPHEN (TYLENOL) 500 MG TABLET    Take 500 mg by mouth every 6 hours as needed. ALBUTEROL (PROVENTIL) (2.5 MG/3ML) 0.083% NEBULIZER SOLUTION    Take 3 mLs by nebulization every 4 hours as needed. BENAZEPRIL (LOTENSIN) 20 MG TABLET    Take 0.5 tablets by mouth 2 times daily for 90 days. CALCIUM-VITAMIN D (CALTRATE 600 PLUS-VIT D PO)    Take 1 tablet by mouth daily. DICYCLOMINE (BENTYL) 20 MG TABLET    Take 1 tablet by mouth every 6 hours. FUROSEMIDE (LASIX) 40 MG TABLET    Take 1 tablet by mouth daily. GATIFLOXACIN (ZYMAR) 0.5 % SOLN    Place 1 drop into the right eye 4 times daily as needed.     HYDROCORTISONE (ANUSOL-HC) 2.5 % RECTAL CREAM    Place rectally 2 times daily. LINACLOTIDE (LINZESS) 145 MCG CAPSULE    Take 1 capsule by mouth every morning (before breakfast). METOPROLOL SUCCINATE (TOPROL XL) 25 MG EXTENDED RELEASE TABLET    Take 1 tablet by mouth daily    MULTIPLE VITAMIN (MULTIVITAMINS PO)    Take 1 tablet by mouth daily. POTASSIUM CHLORIDE SA (K-DUR;KLOR-CON M) 20 MEQ TABLET    Take 1 tablet by mouth daily. PROBIOTIC PRODUCT (PROBIOTIC ACIDOPHILUS) CAPS    Take  by mouth 2 times daily. RANITIDINE (ZANTAC) 150 MG CAPSULE    Take 1 capsule by mouth 2 times daily. For heartburn/reflux    TIOTROPIUM (SPIRIVA HANDIHALER) 18 MCG INHALATION CAPSULE    Inhale 1 capsule into the lungs daily. WARFARIN (COUMADIN) 2.5 MG TABLET    Take 1 tablet by mouth daily       ALLERGIES     Latex; Advair [fluticasone-salmeterol]; Alendronate sodium; Sofia Job; Avelox [moxifloxacin hcl in nacl]; Carafate [sucralfate]; Ciprofloxacin; Clindamycin/lincomycin; Crestor [rosuvastatin]; Erythromycin; Flagyl [metronidazole]; Gemfibrozil; Iodine; Lipitor [atorvastatin]; Penicillins; Pravachol [pravastatin sodium]; Prednisone; Sulfa antibiotics;  Benicar [olmesartan medoxomil]; and Lisinopril    FAMILY HISTORY       Family History   Problem Relation Age of Onset    Heart Disease Mother     Cancer Sister         lung  and breast          SOCIAL HISTORY       Social History     Socioeconomic History    Marital status:      Spouse name: Not on file    Number of children: Not on file    Years of education: Not on file    Highest education level: Not on file   Occupational History    Not on file   Social Needs    Financial resource strain: Not on file    Food insecurity     Worry: Not on file     Inability: Not on file    Transportation needs     Medical: Not on file     Non-medical: Not on file   Tobacco Use    Smoking status: Never Smoker    Smokeless tobacco: Never Used   Substance and Sexual Activity    Alcohol use: No    Drug use: Never    Sexual activity: Yes     Partners: Male   Lifestyle    Physical activity     Days per week: Not on file     Minutes per session: Not on file    Stress: Not on file   Relationships    Social connections     Talks on phone: Not on file     Gets together: Not on file     Attends Jainism service: Not on file     Active member of club or organization: Not on file     Attends meetings of clubs or organizations: Not on file     Relationship status: Not on file    Intimate partner violence     Fear of current or ex partner: Not on file     Emotionally abused: Not on file     Physically abused: Not on file     Forced sexual activity: Not on file   Other Topics Concern    Not on file   Social History Narrative    Not on file       SCREENINGS               PHYSICAL EXAM    (up to 7 for level 4, 8 or more for level 5)     ED Triage Vitals [12/03/20 0855]   BP Temp Temp src Pulse Resp SpO2 Height Weight   (!) 156/82 -- -- 111 (!) 82 -- -- --       Physical Exam  Vitals reviewed: Unresponsive. HENT:      Head: Normocephalic. Right Ear: Ear canal normal.      Left Ear: Ear canal normal.      Nose: Nose normal.   Neck:      Musculoskeletal: Normal range of motion and neck supple. Cardiovascular:      Comments: Patient presented asystole with no pulse patient was on a [employer] compressor  Pulmonary:      Comments: No spontaneous breath sounds patient is being bagged with an eye gel  Abdominal:      General: Abdomen is flat. Skin:     General: Skin is warm. Coloration: Skin is pale.          DIAGNOSTIC RESULTS     EKG: All EKG's are interpreted by the Emergency Department Physician who either signs or Co-signs this chart in the absence of a cardiologist.    Rhythm strip throughout remained asystole    RADIOLOGY:   Non-plain film images such as CT, Ultrasound and MRI are read by the radiologist. Plain radiographic images are visualized and preliminarily interpreted by the emergency physician with the below findings:        Interpretation per the Radiologist below, if available at the time of this note:    No orders to display           LABS:  No results found for this visit on 12/03/20. EMERGENCY DEPARTMENT COURSE and DIFFERENTIAL DIAGNOSIS/MDM:     Vitals:    12/03/20 0855   BP: (!) 156/82   Pulse: 111   Resp: (!) 82           MDM      REASSESSMENT          CRITICAL CARE TIME     CONSULTS:  None      PROCEDURES:     Intraosseous Line Insertion    Date/Time: 12/3/2020 8:45 AM  Performed by: Karen Malik MD  Authorized by: Karen Malik MD     Consent:     Consent obtained:  Emergent situation  Pre-procedure details:     Site preparation:  Chlorhexidine    Preparation: Patient was prepped and draped in usual sterile fashion    Anesthesia (see MAR for exact dosages): Anesthesia method:  None  Procedure details:     Insertion site:  L proximal tibia    Insertion device:  Drill device    Insertion: Needle was inserted through the bony cortex      Number of attempts:  1    Insertion confirmation:  Aspiration of blood/marrow  Post-procedure details:     Secured with:  Transparent dressing    Patient tolerance of procedure: Tolerated well, no immediate complications  Intubation    Date/Time: 12/3/2020 8:50 AM  Performed by: Karen Malik MD  Authorized by: Karen Malik MD     Consent:     Consent obtained:  Emergent situation  Pre-procedure details:     Patient status:  Unresponsive    Mallampati score: I    Pretreatment medications:  None    Paralytics:  None  Procedure details:     Preoxygenation:  Bag valve mask    CPR in progress: yes      Intubation method:  Oral    Oral intubation technique:  Video-assisted    Laryngoscope blade:   Mac 4    Tube size (mm):  7.5    Tube type:  Cuffed    Number of attempts:  1    Tube visualized through cords: yes    Placement assessment:     ETT to lip:  21    Tube secured with:  ETT carlos    Breath sounds:  Equal    Placement verification: chest rise, direct visualization, equal breath sounds and ETCO2 detector    Post-procedure details:     Patient tolerance of procedure: Tolerated well, no immediate complications      Patient underwent supervision of CPR with the [facility] was to his excellent as well as being ventilated every 5 to 6 seconds. The patient had good chest rise equal bilateral breath sounds  Patient underwent several pulse checks during which time patient remained asystole  Patient failed to respond to aggressive ACLS modalities and was thus pronounced asystole and was pronounced then at 9 AM  MEDICATIONS GIVEN THIS VISIT:  Medications - No data to display     Total of 35 minutes of critical care time was spent managing this patient including obtaining history from EMS discussion with the nursing home who had called and said that she was presenting as a full code  Also reviewing old records  Discussion with the 's office  Patient underwent supervision of CPR  Establishment of interosseous-5 minutes procedure time  Establishment of endotracheal intubation-10-minute procedure time    Total dose of 15 minutes for procedures +35 minutes of medical care and supervision of CPR for total of 50 minutes of time spent directly on this patient 15 minutes as mentioned above was for procedures    FINAL IMPRESSION      1. Cardiac arrest ([facility] [address]        Pronouncement at 9 AM    DISPOSITION/PLAN   DISPOSITION  2020 09:08:35 AM      PATIENT REFERRED TO:  No follow-up provider specified. DISCHARGE MEDICATIONS:  New Prescriptions    No medications on file       Controlled Substances Monitoring  No flowsheet data found. (Please note that portions of this note were completed with a voice recognition program.  Efforts were made to edit the dictations but occasionally words are mis-transcribed.)    Patient was advised to return to the Emergency Department if there was any worsening.     Rashawn Fisher MD (electronically signed)  Attending Emergency Physician         April Stafford MD  12/03/20 450 Didier Cespedes MD  12/03/20 0320